# Patient Record
Sex: FEMALE | Race: WHITE | NOT HISPANIC OR LATINO | Employment: FULL TIME | URBAN - METROPOLITAN AREA
[De-identification: names, ages, dates, MRNs, and addresses within clinical notes are randomized per-mention and may not be internally consistent; named-entity substitution may affect disease eponyms.]

---

## 2017-11-21 ENCOUNTER — NURSE TRIAGE (OUTPATIENT)
Dept: NURSING | Facility: CLINIC | Age: 27
End: 2017-11-21

## 2017-11-22 NOTE — TELEPHONE ENCOUNTER
"  Reason for Disposition    [1] Eye has been washed out > 30 minutes ago AND [2] pain persists AND [3] more than mild     \"My eye started hurting today at work. I took my contact out and it was ripped. I have tried washing it out, used eye drops. My eye is still hurting and red. I can't even open it up.\" Denies other sx. Advised ER.    Additional Information    Negative: Doesn't sound like foreign body (FB) in the eye    Negative: Foreign body is a piece of chemical    Negative: Foreign body (FB) stuck on eyeball  (Exception: contact lens)    Negative: [1] Sharp FB (even if FB was removed) AND [2] any pain present now    Negative: [1] Eye has been washed out > 30 minutes ago AND [2] feels like FB is still present    Protocols used: EYE - FOREIGN BODY-ADULT-    "

## 2018-02-12 ENCOUNTER — OFFICE VISIT (OUTPATIENT)
Dept: PEDIATRICS | Facility: CLINIC | Age: 28
End: 2018-02-12
Payer: COMMERCIAL

## 2018-02-12 VITALS
SYSTOLIC BLOOD PRESSURE: 120 MMHG | OXYGEN SATURATION: 98 % | DIASTOLIC BLOOD PRESSURE: 82 MMHG | HEIGHT: 66 IN | BODY MASS INDEX: 47.09 KG/M2 | TEMPERATURE: 98.3 F | WEIGHT: 293 LBS | HEART RATE: 82 BPM

## 2018-02-12 DIAGNOSIS — G43.829 MENSTRUAL MIGRAINE WITHOUT STATUS MIGRAINOSUS, NOT INTRACTABLE: Primary | ICD-10-CM

## 2018-02-12 DIAGNOSIS — S16.1XXA STRAIN OF NECK MUSCLE, INITIAL ENCOUNTER: ICD-10-CM

## 2018-02-12 PROCEDURE — 99213 OFFICE O/P EST LOW 20 MIN: CPT | Performed by: INTERNAL MEDICINE

## 2018-02-12 RX ORDER — LEVONORGESTREL AND ETHINYL ESTRADIOL 0.15-0.03
KIT ORAL
Refills: 0 | COMMUNITY
Start: 2018-01-16 | End: 2018-08-02

## 2018-02-12 RX ORDER — SUMATRIPTAN 25 MG/1
25-50 TABLET, FILM COATED ORAL
Qty: 18 TABLET | Refills: 1 | Status: SHIPPED | OUTPATIENT
Start: 2018-02-12 | End: 2022-02-28

## 2018-02-12 RX ORDER — METHYLPREDNISOLONE 4 MG
TABLET, DOSE PACK ORAL
Refills: 0 | COMMUNITY
Start: 2018-02-09 | End: 2018-02-26

## 2018-02-12 RX ORDER — ALBUTEROL SULFATE 90 UG/1
2 AEROSOL, METERED RESPIRATORY (INHALATION)
COMMUNITY
Start: 2017-04-04 | End: 2018-12-28

## 2018-02-12 RX ORDER — AMOXICILLIN AND CLAVULANATE POTASSIUM 562.5; 437.5; 62.5 MG/1; MG/1; MG/1
TABLET, MULTILAYER, EXTENDED RELEASE ORAL
Refills: 0 | COMMUNITY
Start: 2018-02-09 | End: 2018-02-26

## 2018-02-12 RX ORDER — METHOCARBAMOL 500 MG/1
1000 TABLET, FILM COATED ORAL 3 TIMES DAILY PRN
Qty: 30 TABLET | Refills: 1 | Status: SHIPPED | OUTPATIENT
Start: 2018-02-12 | End: 2019-11-29

## 2018-02-12 NOTE — NURSING NOTE
"Chief Complaint   Patient presents with     URI       Initial /82 (BP Location: Right arm, Patient Position: Chair, Cuff Size: Adult Large)  Pulse 82  Temp 98.3  F (36.8  C) (Oral)  Ht 5' 6\" (1.676 m)  Wt (!) 326 lb 9.6 oz (148.1 kg)  SpO2 98%  BMI 52.71 kg/m2 Estimated body mass index is 52.71 kg/(m^2) as calculated from the following:    Height as of this encounter: 5' 6\" (1.676 m).    Weight as of this encounter: 326 lb 9.6 oz (148.1 kg).  Medication Reconciliation: complete   Jolene Sullivan MA 12:36 PM 2/12/2018     "

## 2018-02-12 NOTE — MR AVS SNAPSHOT
After Visit Summary   2/12/2018    Marlee Appiah    MRN: [id number]           Patient Information     Date Of Birth          1990        Visit Information        Provider Department      2/12/2018 12:30 PM [facility]ardo Mai, MD [facility]        Today's Diagnoses     Menstrual migraine without status migrainosus, not intractable    -  1    Strain of neck muscle, initial encounter        BMI 50.0-59.9, adult (H)           Follow-ups after your visit        Additional Services     ENDOCRINOLOGY ADULT REFERRAL       Your provider has referred you to: [facility]: [facility] [phone]   [email or web address]      Please be aware that coverage of these services is subject to the terms and limitations of your health insurance plan.  Call member services at your health plan with any benefit or coverage questions.      Please bring the following to your appointment:    >>   Any x-rays, CTs or MRIs which have been performed.  Contact the facility where they were done to arrange for  prior to your scheduled appointment.    >>   List of current medications   >>   This referral request   >>   Any documents/labs given to you for this referral                  Who to contact     If you have questions or need follow up information about today's clinic visit or your schedule please contact [facility] directly at [phone].  Normal or non-critical lab and imaging results will be communicated to you by MyChart, letter or phone within 4 business days after the clinic has received the results. If you do not hear from us within 7 days, please contact the clinic through MyChart or phone. If you have a critical or abnormal lab result, we will notify you by phone as soon as possible.  Submit refill requests through [employer] or call your pharmacy and they will forward the refill request to us. Please allow 3 business days for your refill to be completed.  "         Additional Information About Your Visit        Blink (air taxi)hart Information     [employer] gives you secure access to your electronic health record. If you see a primary care provider, you can also send messages to your care team and make appointments. If you have questions, please call your primary care clinic.  If you do not have a primary care provider, please call [phone] and they will assist you.        Care EveryWhere ID     This is your Care EveryWhere ID. This could be used by other organizations to access your [facility] medical records  [id number]        Your Vitals Were     Pulse Temperature Height Pulse Oximetry BMI (Body Mass Index)       82 98.3  F (36.8  C) (Oral) 5' 6\" (1.676 m) 98% 52.71 kg/m2        Blood Pressure from Last 3 Encounters:   02/12/18 120/82   12/21/11 128/80    Weight from Last 3 Encounters:   02/12/18 (!) 326 lb 9.6 oz (148.1 kg)              We Performed the Following     ENDOCRINOLOGY ADULT REFERRAL          Today's Medication Changes          These changes are accurate as of 2/12/18  1:23 PM.  If you have any questions, ask your nurse or doctor.               Start taking these medicines.        Dose/Directions    methocarbamol 500 MG tablet   Commonly known as:  ROBAXIN   Used for:  Strain of neck muscle, initial encounter   Started by:  Mike Leonardo Mai, MD        Dose:  1000 mg   Take 2 tablets (1,000 mg) by mouth 3 times daily as needed for muscle spasms   Quantity:  30 tablet   Refills:  1       SUMAtriptan 25 MG tablet   Commonly known as:  IMITREX   Used for:  Menstrual migraine without status migrainosus, not intractable   Started by:  Mike Leonardo Mai, MD        Dose:  25-50 mg   Take 1-2 tablets (25-50 mg) by mouth at onset of headache for migraine May repeat in 2 hours. Max 8 tablets/24 hours.   Quantity:  18 tablet   Refills:  1            Where to get your medicines      These medications were sent to [facility] [address], [place] - [address]  [address] [place] [address]     Phone:  [phone]     methocarbamol 500 MG tablet    SUMAtriptan 25 MG tablet                Primary Care Provider Office Phone # Fax #    Mike Leonardo [phone] [address] [place] [address]        Equal Access to Services     [facility]: Hadii aad ku hadasho Soomaali, waaxda luqadaha, qaybta kaalmada adeegyada, waxay idiin hayaan adeeg kharash la'aan ah. So [facility] [phone].    ATENCIÓN: Si habla español, tiene a diego disposición servicios gratuitos de asistencia lingüística. [facility] [phone].    We comply with applicable federal civil rights laws and [place] laws. We do not discriminate on the basis of race, color, national origin, age, disability, sex, sexual orientation, or gender identity.            Thank you!     Thank you for choosing [facility]  for your care. Our goal is always to provide you with excellent care. Hearing back from our patients is one way we can continue to improve our services. Please take a few minutes to complete the written survey that you may receive in the mail after your visit with us. Thank you!             Your Updated Medication List - Protect others around you: Learn how to safely use, store and throw away your medicines at www.disposemymeds.org.          This list is accurate as of 2/12/18  1:23 PM.  Always use your most recent med list.                   Brand Name Dispense Instructions for use Diagnosis    albuterol 108 (90 BASE) MCG/ACT Inhaler    PROAIR HFA/PROVENTIL HFA/VENTOLIN HFA     Inhale 2 puffs into the lungs        amoxicillin-clavulanate 1000-62.5 MG per 12 hr tablet    AUGMENTIN XR     TK 1 T PO BID AS DIRECTED        methocarbamol 500 MG tablet    ROBAXIN    30 tablet    Take 2 tablets (1,000 mg) by mouth 3 times daily as needed for muscle spasms    Strain of neck muscle, initial encounter       methylPREDNISolone 4  MG tablet    MEDROL DOSEPAK     TK UTD        PORTIA-28 0.15-30 MG-MCG per tablet   Generic drug:  levonorgestrel-ethinyl estradiol      TK 1 T PO QD        SUMAtriptan 25 MG tablet    IMITREX    18 tablet    Take 1-2 tablets (25-50 mg) by mouth at onset of headache for migraine May repeat in 2 hours. Max 8 tablets/24 hours.    Menstrual migraine without status migrainosus, not intractable

## 2018-02-12 NOTE — PROGRESS NOTES
"  SUBJECTIVE:   Marlee Appiah is a 27 year old female who presents to clinic today for the following health issues:    RESPIRATORY SYMPTOMS      Duration: ***    Description  { :022247}    Severity: {MILD, MODERATE, SEVERE LOW:900651}    Accompanying signs and symptoms: {NONE DEFAULTED:049204::\"None\"}    History (predisposing factors):  { :301221::\"none\"}    Precipitating or alleviating factors: {NONE DEFAULTED:067328::\"None\"}    Therapies tried and outcome:  { :287205::\"none\"}      {additional problems for provider to add:654161}    Problem list and histories reviewed & adjusted, as indicated.  Additional history: {NONE - AS DOCUMENTED:499877::\"as documented\"}    {HIST REVIEW/ LINKS 2:175391}    Reviewed and updated as needed this visit by clinical staff       Reviewed and updated as needed this visit by Provider         {PROVIDER CHARTING PREFERENCE:566866}  "

## 2018-02-12 NOTE — PATIENT INSTRUCTIONS
About Migraine Headaches  What is a migraine headache?  A migraine is a very painful type of headache. It may last a few hours or days. During a migraine, you may have vision problems and feel sick to your stomach.  Migraines are three times more common in women than in men. Once they start, you may get them for the rest of your life. They may occur less often as you age.  What causes it?  We don't know the exact cause, but many things can trigger a migraine. These include:    Stress and anxiety    Lack of food or sleep    Foods and drinks that contain tyramine, such as:    Red yoav and some beers    Aged cheeses (like cheddar or blue cheese)    Yeast    Aged, dried or cured meats    Fermented foods like sauerkraut, soy sauce, miso and dominic chi    Too much light    Chemicals (gasoline, cleaning products, perfume, glue, etc.)    Weather changes    Certain medicines    Hormone changes (in women).  What are symptoms?  Some people can tell when they're about to have a migraine. They may see flashing lights or zigzag lines in front of their eyes. Or they may lose their vision for a short time.  With a migraine, you may:    Feel pain or pulsing on one side of the head. For some people, the entire head hurts.    Be very sensitive to light and sound.    Feel nauseated (sick to your stomach) and vomit (throw up).  How is it treated?  Your care team may suggest medicine to prevent or relieve your symptoms. Once you start having migraines, you may also need medicine to keep them from getting worse.   Take your medicine at the first sign of a migraine. It may take several tries to find a medicine that works for you.   When a migraine comes:    Lie down in a quiet, dark room. Try not to bend over, as this may cause more pain.    Put a cold pack on your head. Try a bag of frozen vegetables, wrapped with a thin cloth.    Drink extra fluids. If you can't drink, suck on ice chips.  How can I prevent migraines?  It will help to keep  a migraine diary. By keeping a diary, you may find the cause of your headaches. Once you know the cause, you can take steps to prevent migraines in the future.  It also helps to live a healthy lifestyle. This means:    Get regular exercise. (If exercise triggers your headaches, tell your doctor.)    Drink plenty of water.    Eat healthy meals at regular times.    Try to go to bed and get up and regular times.    Don't smoke. Avoid second-hand smoke.    Avoid caffeine. Coffee, tea and soda may help a migraine. But if you drink them too often, they can cause migraines.    Find ways to relax, have fun and reduce stress in your life.    Try complementary therapies (yoga, acupuncture, massage, biofeedback, etc.).  When should I call my clinic?  Call your clinic at once if you have new or unusual symptoms, such as:     Pain that gets worse or lasts more than 24 hours.    Slurred speech or problems talking.    A weak arm or leg that you can't move normally.    A fever over 100 F (37.8 C), taken under the tongue.    Stiff neck.    Vomiting (throwing up) for several hours.  For more information about migraines  Contact the [employer] [place] for Headache Education (ACHE) at 1-667.943.3352 or www.headaches.org.  Local providers of complementary therapies  These services may not be covered by insurance. Check your insurance plan.  [facility]  [phone]   Includes pain education, behavioral therapy,   trigger point injections and more.  [facility]   [phone]   Includes acupuncture, massage, myofascial release.  [facility] at the [facility]  [phone]  [email or web address]  Includes mindfulness, meditation, yoga.  Community Education     [place]: commed.mpls.k12.[place].[facility]. [place]: commedprograms.spps.org  Look for programs on yoga, mindfulness, etc.  [facility]    [phone]  www.[place]ways[place].org  Includes mindfulness, yoga, body-mind skills.  For informational purposes only. Not to replace the advice of your health care provider.   Copyright   2011 [employer] [facility]. All rights reserved. [employer] 311023 - 11/15.    Preventing Migraine Headaches: Medicines and Lifestyle Changes     Going to bed and getting up at the same time each day, including weekends, may help prevent migraines.     A migraine is a type of severe headache. Having a migraine can be very painful. But there are steps you can take to help prevent migraines.  Medicines to help prevent migraines    Your healthcare provider may prescribe certain medicines to help prevent migraines. These medicines may need to be taken daily. Or they may only need to be taken at times when you re likely to have a migraine.    Common medicines used to help prevent migraines include:    Triptans (serotonin receptor agonists)    Nonsteroidal anti-inflammatory drugs (available over-the-counter)    Beta-blockers    Anticonvulsants    Tricyclic antidepressants    Calcium channel blockers    Certain vitamins, minerals, and plant extracts    Botulinum toxin injection (Botox) for certain chronic migraines     CGRP (calcitonin gene-related peptide) agnonists are being reviewed by the Food and Drug Administration (FDA)  Lifestyle changes for long-term prevention  Here are some suggestions:    Exercise. Regular exercise can help prevent migraines and improve your health. (If exercise triggers your migraines, talk to your healthcare provider.)    Keep regular habits. Don t skip or delay meals. Drink plenty of water. And go to bed and get up at about the same time each day. This includes weekends.    Try alternative treatments. These are treatments that do not involve the use of medicines or surgery. They may help relieve symptoms and prevent migraines. Some treatment options include biofeedback and acupuncture. Ask  your healthcare provider to tell you more about these treatments if you have questions.    Limit caffeine. You may find that caffeine helps relieve pain during an attack. But too much caffeine can also trigger migraines. So, limit the amount of caffeine you consume.  Date Last Reviewed: 10/11/2015    0312-6397 [employer]. [address] [place] [address], [place] [address]. All rights reserved. This information is not intended as a substitute for professional medical care. Always follow your healthcare professional's instructions.

## 2018-02-12 NOTE — PROGRESS NOTES
"  SUBJECTIVE:   Marlee Appiah is a 27 year old female who presents to clinic today for the following health issues:    Headaches      Duration: 1 year, happens every month    Description  Location: unilateral in the right occipital area   Character: throbbing pain, sharp pain  Frequency:  All day for 1 week before period  Duration:  1 week    Intensity:  severe    Accompanying signs and symptoms:    Precipitating or Alleviating factors:  Nausea/vomiting: no  Dizziness: usually  Weakness or numbness: no  Visual changes: sensitivity to lights  Fever: no   Sinus or URI symptoms YES- on antibiotics for sinusitis    History  Head trauma: YES- a few years ago patient fell on the ice and hit the back of her head and has gotten cluster headaches  Family history of migraines: no   Previous tests for headaches: YES- MRI with neuro with normal results  Neurologist evaluations: YES- cluster headaches   Able to do daily activities when headache present: YES- but makes it difficult with reading as patient is currently in school.  Wake with headaches: YES  Daily pain medication use: no, only when headaches happen 1 week a month  Any changes in: none    Precipitating or Alleviating factors (light/sound/sleep/caffeine): Always happens 1 week prior to getting period    Therapies tried and outcome: heat, ice and Ibuprofen (Advil, Motrin)    Outcome - effective except for heat  Frequent/daily pain medication use: no     A week before period patient states that she gets \"insane headaches\". Patient searches online and said her symptoms sounded like occipital neurologia. Wants to discuss muscle relaxers.  Used to get tension headaches (frontal and behind the eyes) and was diagnosed by neurologist.  These headaches are different and patient started noticing the pattern for at least 6 months.    Problem list and histories reviewed & adjusted, as indicated.  Additional history: as documented    There is no problem list on file for this " "patient.    History reviewed. No pertinent surgical history.    Social History   Substance Use Topics     Smoking status: Never Smoker     Smokeless tobacco: Never Used     Alcohol use No     Family History   Problem Relation Age of Onset     DIABETES Father          Current Outpatient Prescriptions   Medication Sig Dispense Refill     albuterol (PROAIR HFA/PROVENTIL HFA/VENTOLIN HFA) 108 (90 BASE) MCG/ACT Inhaler Inhale 2 puffs into the lungs       amoxicillin-clavulanate (AUGMENTIN XR) 1000-62.5 MG per 12 hr tablet TK 1 T PO BID AS DIRECTED  0     NICKOLAS-28 0.15-30 MG-MCG per tablet TK 1 T PO QD  0     methylPREDNISolone (MEDROL DOSEPAK) 4 MG tablet TK UTD  0     No Known Allergies    Reviewed and updated as needed this visit by clinical staff       Reviewed and updated as needed this visit by Provider         ROS:  All other systems on a 10-point review are negative, unless otherwise noted in HPI      OBJECTIVE:     /82 (BP Location: Right arm, Patient Position: Chair, Cuff Size: Adult Large)  Pulse 82  Temp 98.3  F (36.8  C) (Oral)  Ht 5' 6\" (1.676 m)  Wt (!) 326 lb 9.6 oz (148.1 kg)  SpO2 98%  BMI 52.71 kg/m2  Body mass index is 52.71 kg/(m^2).  GENERAL: healthy, alert and no distress  EYES: Eyes grossly normal to inspection, PERRL and conjunctivae and sclerae normal  HENT: ear canals and TM's normal, nose and mouth without ulcers or lesions  NECK: no adenopathy, no asymmetry, masses, or scars and thyroid normal to palpation  RESP: lungs clear to auscultation - no rales, rhonchi or wheezes  CV: regular rate and rhythm, normal S1 S2, no S3 or S4, no murmur, click or rub, no peripheral edema and peripheral pulses strong  ABDOMEN: soft, nontender, no hepatosplenomegaly, no masses and bowel sounds normal  MS: no gross musculoskeletal defects noted, no edema  NEURO: Normal strength and tone, sensory exam grossly normal, mentation intact, cranial nerves 2-12 intact and DTR's normal and symmetric "     Diagnostic Test Results:  none     ASSESSMENT/PLAN:       1. Menstrual migraine without status migrainosus, not intractable  Will start with triptan therapy.  If no improvement, will consider continuous birth control.  - SUMAtriptan (IMITREX) 25 MG tablet; Take 1-2 tablets (25-50 mg) by mouth at onset of headache for migraine May repeat in 2 hours. Max 8 tablets/24 hours.  Dispense: 18 tablet; Refill: 1    2. Strain of neck muscle, initial encounter  Headaches often start with strain in neck.  Will do a trial of muscle relaxer.  - methocarbamol (ROBAXIN) 500 MG tablet; Take 2 tablets (1,000 mg) by mouth 3 times daily as needed for muscle spasms  Dispense: 30 tablet; Refill: 1    3. BMI 50.0-59.9, adult (H)  Has already tried several options including , nutrition counseling, and calorie counting.  - ENDOCRINOLOGY ADULT REFERRAL    See Patient Instructions    Marlena Leonardo MD  [facility]

## 2018-02-18 ENCOUNTER — HEALTH MAINTENANCE LETTER (OUTPATIENT)
Age: 28
End: 2018-02-18

## 2018-02-26 ENCOUNTER — OFFICE VISIT (OUTPATIENT)
Dept: ENDOCRINOLOGY | Facility: CLINIC | Age: 28
End: 2018-02-26
Payer: COMMERCIAL

## 2018-02-26 VITALS
TEMPERATURE: 98.1 F | SYSTOLIC BLOOD PRESSURE: 118 MMHG | BODY MASS INDEX: 47.09 KG/M2 | HEART RATE: 100 BPM | OXYGEN SATURATION: 99 % | HEIGHT: 66 IN | WEIGHT: 293 LBS | DIASTOLIC BLOOD PRESSURE: 80 MMHG

## 2018-02-26 DIAGNOSIS — E66.01 MORBID OBESITY (H): ICD-10-CM

## 2018-02-26 LAB — HBA1C MFR BLD: 5.6 % (ref 4.3–6)

## 2018-02-26 PROCEDURE — 99203 OFFICE O/P NEW LOW 30 MIN: CPT | Performed by: INTERNAL MEDICINE

## 2018-02-26 PROCEDURE — 84443 ASSAY THYROID STIM HORMONE: CPT | Performed by: INTERNAL MEDICINE

## 2018-02-26 PROCEDURE — 36415 COLL VENOUS BLD VENIPUNCTURE: CPT | Performed by: INTERNAL MEDICINE

## 2018-02-26 PROCEDURE — 83036 HEMOGLOBIN GLYCOSYLATED A1C: CPT | Performed by: INTERNAL MEDICINE

## 2018-02-26 NOTE — LETTER
2/26/2018         RE: Marlee Appiah  [address]  [place] [address]        Dear Colleague,    Thank you for referring your patient, Marlee Appiah, to the [facility]. Please see a copy of my visit note below.    Name: Marlee Appiah  Seen at the request of No ref. provider found for obesity.  HPI:  Marlee Appiah is a 27 year old female who presents for the evaluation of obestiy.  Body mass index is 54.44 kg/(m^2).  Growing up was heavy.  Always was a heavy person.  Weight gain started: 5 years back-- depression at that time.  Highest weight as an adult: 337  lbs  Lowest weight as an adult: in high school-- 220 lbs    Diets tried: tried ww, santo man diet and portion controlled     Gastric bypass history: no. Does not want it 2/2 to possible s/e. Dad had it and had s/e.    Hypothyroidism: no    Use of Steroids:No  Family history of Obesity:Yes: many family members  Diet: currently pt is working on-- portion control.  Exercise:Yes: during summer more exercise. 15-30 min on elliptical 3-4 times/day. Was seen by .    Menses: regular. Not planning pregnancy.  Diarrhea/Constipation:No  Changes in Hair or Skin:No  Diabetes:No   Snores :thinks that she snores at night   HTN: no  Hyperlipidemia:No  Hirsutism:No  Easy Brusing:No    PMH/PSH:  History reviewed. No pertinent past medical history.  History reviewed. No pertinent surgical history.  Family Hx:  Family History   Problem Relation Age of Onset     DIABETES Father             Social Hx:  Social History     Social History     Marital status: Single     Spouse name: N/A     Number of children: N/A     Years of education: N/A     Occupational History     Not on file.     Social History Main Topics     Smoking status: Never Smoker     Smokeless tobacco: Never Used     Alcohol use No     Drug use: No     Sexual activity: Not Currently     Other Topics Concern     Not on file     Social History Narrative          MEDICATIONS:  has a  "current medication list which includes the following prescription(s): albuterol, sushant-28, sumatriptan, and methocarbamol.    ROS     ROS: 10 point ROS neg other than the symptoms noted above in the HPI.    Physical Exam   VS: /80 (BP Location: Right arm, Patient Position: Chair, Cuff Size: Adult Large)  Pulse 100  Temp 98.1  F (36.7  C) (Oral)  Ht 1.676 m (5' 6\")  Wt (!) 153 kg (337 lb 4.8 oz)  SpO2 99%  BMI 54.44 kg/m2  GENERAL: AXOX3, NAD, well dressed, answering questions appropriately, appears stated age.  HEENT: No exopthalmous, no proptosis, EOMI, no lig lag, no retraction  NECK: Thyroid normal in size, non tender, no nodules were palpated.  CV: RRR, no rubs, gallops, no murmurs  LUNGS: CTAB, no wheezes, rales, or ronchi  ABDOMEN: +BS. + stretch marks, not hyperpigmented.  EXTREMITIES: no edema, +pulses, no rashes, no lesions  NEUROLOGY: CN grossly intact, + DTR upper and lower extremity, no tremors  MSK: grossly intact  SKIN: no rashes, no lesions    LABS:  No recent labs to review    All pertinent notes, labs, and images personally reviewed by me.     A/P  Ms.Angela Appiah is a 27 year old here for the evaluation of obestiy:    1. Obesity-    Body mass index is 54.44 kg/(m^2).  Obesity is associated with a significant increase in mortality and risk of many disorders, including diabetes mellitus, hypertension, dyslipidemia, heart disease, stroke, sleep apnea, cancer, and many others. Conversely, weight loss is associated with a reduction in obesity-associated morbidity.  Endocrine evaluation of obesity includes Cushing's and thyroid dysfunction.  Will obtain TSH and freeT4 along with 24 hour urine collection.   She is not interested in bariatric surgery.    Plan: WEIGHT MANAGEMENT INFO, TSH with free T4         reflex, Cortisol free urine, HEALTH          REFERRAL, Hemoglobin A1c, NUTRITION REFERRAL,         HEALTH  REFERRAL, SLEEP EVALUATION &         MANAGEMENT REFERRAL - ADULT " -[facility]  [phone] (Age 18 and         up)               Discussed:  I informed the pt that:  1.Weight loss medications can be taken to assist with weight reduction when combined with appropriate healthy lifestyle changes.  2.I discussed possible s/e, risks and benefits of weight loss medications.  3.All medications are FDA approved, however, some may be used ''off label'' for their weight loss benefitIs and some ''short term'' medications can be used for longer periods to achieve desired clinical outcomes.  4.All patients taking weight loss medications must be seen in clinic for refill authorization.  Risks of obestiy discussed. Encourage healthy diet. Limit snacks, fluid calories, portions. Limit TV/computer time to one hour a day. Encourage physical activity. Recheck in six months or sooner with concerns.    Obesity is a biological preventable and treatable disease, which is associated with significantly increased risk of many acute and chronic health conditions. Obesity has now been recognized as a chronic disease by the American Medical Association.    A range of serious co-morbidities are associated with obesity including increased risk for hypertension, stroke, coronary artery disease, dyslipidemia, Type II diabetes, depression, sleep apnea, cancers of the colon, breast and endometrium, osteoarthritis and female infertility. Therefore, obesity is not just a problem; it s a disease that warrants serious evidence based treatements.    I explained to the patient relevant work up for the diagnosis and management of obesity and discussed treatment options. Body Mass Index (BMI) has been a standard means for calculating risk for overweight and obesity. The new American Association of Clinical Endocrinology (AACE) algorithm recommends lifestyle modifications as the initial phase of treatment for all patients with the BMI equal or greater than 25 kg/m2. Lifestyle modifications  includes use of medical nutrition therapy, exercise, tobacco cessation, adequate quality and quantity of sleep, limited consumption of alcohol and reduced stress through implementation of a structured, multidisciplinary program.    In patients with complications associated with obesity, graded interventions are recommended including pharmacological therapy such as phentermine, orlistat, lorcaserin and phentermine/topiramate ER, contrave ( buproprion/naltreone) and the use of very low calorie meal replacement programs.    If medical intervention is insufficient, surgical therapy may be considered, especially in patients with BMI greater than or equal to 35 kg/m2 with multiple complications. Surgical treatments include lap-band, gastric sleeve or gastric bypass surgery.      More than 50% of the time spent with Ms. Appiah on counseling / coordinating her care.  Total appointment time was 30 minutes.      Follow-up:  After above    Andree Cunningham MD  Endocrinology   [facility]    CC: Mike Leonardo Mai    Addendum to above note and clinic visit:    Labs reviewed.    See result note/telephone encounter.            Again, thank you for allowing me to participate in the care of your patient.        Sincerely,        Andree Cunningham MD

## 2018-02-26 NOTE — MR AVS SNAPSHOT
After Visit Summary   2018    Marlee Appiah    MRN: [id number]           Patient Information     Date Of Birth          1990        Visit Information        Provider Department      2018 4:00 PM Andree Cunningham MD [facility]        Today's Diagnoses     Morbid obesity (H)          Care Instructions    [facility] & [place] locations   Dr Cunningham, Endocrinology Department      [facility]   [address]  [place] [address]  Appointment [phone]  Fax: [phone]  [place]: Monday and Tuesday         [place] [address]  [place] [address]  Appointment [phone]  Fax: [phone]  [place]: Wednesday and Thursday            Labs today  Follow up with dietician ( if covered by insurance) and sleep study  Follow up after above  The patient is advised to Make better food choices: reduce carbs, Reduce portion size, weight loss and exercise 3-4 times a week.      24 Hour Urine Collection    - During a 24-hour urine collection, follow your usual diet and drink fluids as you ordinarily would.  - Avoid drinking alcohol before and during the urine collection.    - For a 24-hour urine collection, all of the urine that you pass over a 24-hour time period must be collected. You will receive a special container to collect the sample.    The following are directions for collecting a 24-hour urine sample while at home:    In the morning scheduled to begin the urine collection, urinate in the toilet and flush away the first urine you pass. Write down the date and time. That is the start date and time for the collection.    Collect all urine you pass, day and night, for 24 hours. Use the container given to you to collect the urine. Avoid using other containers. The urine sample must include the last urine that you pass 24 hours after starting the  collection. Do not allow toilet paper, stool, or anything else to be added to the urine sample.    Write down the date and time that the last sample is collected.    Health :  A health  will reach out to you over the telephone within the next 3 business days to answer any questions you may have and set up your first coaching session. If they are not able to reach you please feel free to call back at the number left on your voicemail. Thank you and we are excited you are taking advantage of this wonderful opportunity to improve your health!   With this program, patients have an opportunity to work with a Health , over the phone at no cost for 6 coaching total sessions over a period of seven months.   [facility] use a non-judgmental collaborative approach when working with patients. Coaches support patients in gaining knowledge, skills, tools and confidence to become active participants in their health. Your  will work with you one-on-one to set small, achievable goals and provide accountability to help you meet your vision.   [facility] have had success supporting patients in the following areas:   * Chronic Disease Management   * Weight Management   * Nutrition   * Exercise   * Stress Management   * Other Health Related Goals                Follow-ups after your visit        Additional Services     HEALTH  REFERRAL       Your provider has referred you to a Health .    A Health  will reach out to the patient within three days, if the following criteria has been met.     Clinic: [facility]    Reason for Referral: Obesity Body mass index is 54.44 kg/(m^2).    Patient does have knowledge of this service.    Patient Criteria: obesity Body mass index is 54.44 kg/(m^2).      Provider's Main Focus: Diet/Weight Loss            NUTRITION REFERRAL       Your provider has referred you to: [facility]: [facility] [place]  [place] [phone]   [email or web address]    Please be aware that coverage of these services is subject to the terms and limitations of your health insurance plan.  Call member services at your health plan with any benefit or coverage questions.      Please bring the following to your appointment:      >>   This referral request   >>   Any documents given to you for this referral  >>   Any specific questions you have about diet or food choices                  Future tests that were ordered for you today     Open Future Orders        Priority Expected Expires Ordered    Cortisol free urine Routine  2/27/2019 2/26/2018            Who to contact     If you have questions or need follow up information about today's clinic visit or your schedule please contact [facility] [place] directly at [phone].  Normal or non-critical lab and imaging results will be communicated to you by MyChart, letter or phone within 4 business days after the clinic has received the results. If you do not hear from us within 7 days, please contact the clinic through MyChart or phone. If you have a critical or abnormal lab result, we will notify you by phone as soon as possible.  Submit refill requests through [facility] or call your pharmacy and they will forward the refill request to us. Please allow 3 business days for your refill to be completed.          Additional Information About Your Visit        [employer]ks Information     [employer] gives you secure access to your electronic health record. If you see a primary care provider, you can also send messages to your care team and make appointments. If you have questions, please call your primary care clinic.  If you do not have a primary care provider, please call [phone] and they will assist you.        Care EveryWhere ID     This is your Care EveryWhere ID. This could be used by other organizations to access your [facility]  "records  [id number]        Your Vitals Were     Pulse Temperature Height Pulse Oximetry BMI (Body Mass Index)       100 98.1  F (36.7  C) (Oral) 1.676 m (5' 6\") 99% 54.44 kg/m2        Blood Pressure from Last 3 Encounters:   02/26/18 118/80   02/12/18 120/82   12/21/11 128/80    Weight from Last 3 Encounters:   02/26/18 (!) 153 kg (337 lb 4.8 oz)   02/12/18 (!) 148.1 kg (326 lb 9.6 oz)              We Performed the Following     HEALTH  REFERRAL     Hemoglobin A1c     NUTRITION REFERRAL     TSH with free T4 reflex     WEIGHT MANAGEMENT INFO        Primary Care Provider Office Phone # Fax #    Sadialayne Hortensia Leonardo [phone] [address] [place] [address]        Equal Access to Services     [facility]: Hadii aad ku hadasho Soember, waaxda luqadaha, qaybta kaalmada netteyamini, jr rivera . So [facility] [phone].    ATENCIÓN: Si habla español, tiene a diego disposición servicios gratuitos de asistencia lingüística. LlThe [facility] [phone].    We comply with applicable federal civil rights laws and [place] laws. We do not discriminate on the basis of race, color, national origin, age, disability, sex, sexual orientation, or gender identity.            Thank you!     [facility]. Our goal is always to provide you with excellent care. Hearing back from our patients is one way we can continue to improve our services. Please take a few minutes to complete the written survey that you may receive in the mail after your visit with us. Thank you!             Your Updated Medication List - Protect others around you: Learn how to safely use, store and throw away your medicines at www.disposemymeds.org.          This list is accurate as of 2/26/18  4:26 PM.  Always use your most recent med list.                   Brand Name Dispense Instructions for use Diagnosis    albuterol 108 (90 BASE) MCG/ACT Inhaler    " PROAIR HFA/PROVENTIL HFA/VENTOLIN HFA     Inhale 2 puffs into the lungs        methocarbamol 500 MG tablet    ROBAXIN    30 tablet    Take 2 tablets (1,000 mg) by mouth 3 times daily as needed for muscle spasms    Strain of neck muscle, initial encounter       NICKOLAS-28 0.15-30 MG-MCG per tablet   Generic drug:  levonorgestrel-ethinyl estradiol      TK 1 T PO QD        SUMAtriptan 25 MG tablet    IMITREX    18 tablet    Take 1-2 tablets (25-50 mg) by mouth at onset of headache for migraine May repeat in 2 hours. Max 8 tablets/24 hours.    Menstrual migraine without status migrainosus, not intractable

## 2018-02-26 NOTE — NURSING NOTE
"Chief Complaint   Patient presents with     Referral     Dr Leonardo Weight        Initial /80 (BP Location: Right arm, Patient Position: Chair, Cuff Size: Adult Large)  Pulse 100  Temp 98.1  F (36.7  C) (Oral)  Ht 1.676 m (5' 6\")  Wt (!) 153 kg (337 lb 4.8 oz)  SpO2 99%  BMI 54.44 kg/m2 Estimated body mass index is 54.44 kg/(m^2) as calculated from the following:    Height as of this encounter: 1.676 m (5' 6\").    Weight as of this encounter: 153 kg (337 lb 4.8 oz).  Medication Reconciliation: complete     ENDOCRINOLOGY INTAKE FORM    Patient Name:  Marlee Appiah  :  1990    Is patient Diabetic?   No  Does patient have non-diabetic or other endocrine issues?  Yes: weight     Vitals: /80 (BP Location: Right arm, Patient Position: Chair, Cuff Size: Adult Large)  Pulse 100  Temp 98.1  F (36.7  C) (Oral)  Ht 1.676 m (5' 6\")  Wt (!) 153 kg (337 lb 4.8 oz)  SpO2 99%  BMI 54.44 kg/m2  BMI= Body mass index is 54.44 kg/(m^2).    Flu vaccine:  No  Pneumonia vaccine:  No    Smoking and Alcohol use:  Social History   Substance Use Topics     Smoking status: Never Smoker     Smokeless tobacco: Never Used     Alcohol use No         OBESITY CONCERNS:  No ref. provider found       Initial Visit Previous Visit Current Change   Weight   153kg    Height   5 6     BMI   54.44      Weight at graduation of high school: 220lbs  Diabetes:  No  Sleep Apnea/Snores: Yes: snore  Hypertension:  No  Hyperlipidemia:  No  Use of steroids:  No  Family history of obesity:  Yes: both sides  Diet:  Good   Exercise: Yes: a little     Staff Signature:  Jillian Drake CMA ([facility])        "

## 2018-02-26 NOTE — PROGRESS NOTES
Name: Marlee Appiah  Seen at the request of No ref. provider found for obesity.  HPI:  Marlee Appiah is a 27 year old female who presents for the evaluation of obestiy.  Body mass index is 54.44 kg/(m^2).  Growing up was heavy.  Always was a heavy person.  Weight gain started: 5 years back-- depression at that time.  Highest weight as an adult: 337  lbs  Lowest weight as an adult: in high school-- 220 lbs    Diets tried: tried ww, cave man diet and portion controlled     Gastric bypass history: no. Does not want it 2/2 to possible s/e. Dad had it and had s/e.    Hypothyroidism: no    Use of Steroids:No  Family history of Obesity:Yes: many family members  Diet: currently pt is working on-- portion control.  Exercise:Yes: during summer more exercise. 15-30 min on elliptical 3-4 times/day. Was seen by .    Menses: regular. Not planning pregnancy.  Diarrhea/Constipation:No  Changes in Hair or Skin:No  Diabetes:No   Snores :thinks that she snores at night   HTN: no  Hyperlipidemia:No  Hirsutism:No  Easy Brusing:No    PMH/PSH:  History reviewed. No pertinent past medical history.  History reviewed. No pertinent surgical history.  Family Hx:  Family History   Problem Relation Age of Onset     DIABETES Father             Social Hx:  Social History     Social History     Marital status: Single     Spouse name: N/A     Number of children: N/A     Years of education: N/A     Occupational History     Not on file.     Social History Main Topics     Smoking status: Never Smoker     Smokeless tobacco: Never Used     Alcohol use No     Drug use: No     Sexual activity: Not Currently     Other Topics Concern     Not on file     Social History Narrative          MEDICATIONS:  has a current medication list which includes the following prescription(s): albuterol, sushant-28, sumatriptan, and methocarbamol.    ROS     ROS: 10 point ROS neg other than the symptoms noted above in the HPI.    Physical Exam   VS: /80  "(BP Location: Right arm, Patient Position: Chair, Cuff Size: Adult Large)  Pulse 100  Temp 98.1  F (36.7  C) (Oral)  Ht 1.676 m (5' 6\")  Wt (!) 153 kg (337 lb 4.8 oz)  SpO2 99%  BMI 54.44 kg/m2  GENERAL: AXOX3, NAD, well dressed, answering questions appropriately, appears stated age.  HEENT: No exopthalmous, no proptosis, EOMI, no lig lag, no retraction  NECK: Thyroid normal in size, non tender, no nodules were palpated.  CV: RRR, no rubs, gallops, no murmurs  LUNGS: CTAB, no wheezes, rales, or ronchi  ABDOMEN: +BS. + stretch marks, not hyperpigmented.  EXTREMITIES: no edema, +pulses, no rashes, no lesions  NEUROLOGY: CN grossly intact, + DTR upper and lower extremity, no tremors  MSK: grossly intact  SKIN: no rashes, no lesions    LABS:  No recent labs to review    All pertinent notes, labs, and images personally reviewed by me.     A/P  Ms.Angela Appiah is a 27 year old here for the evaluation of obestiy:    1. Obesity-    Body mass index is 54.44 kg/(m^2).  Obesity is associated with a significant increase in mortality and risk of many disorders, including diabetes mellitus, hypertension, dyslipidemia, heart disease, stroke, sleep apnea, cancer, and many others. Conversely, weight loss is associated with a reduction in obesity-associated morbidity.  Endocrine evaluation of obesity includes Cushing's and thyroid dysfunction.  Will obtain TSH and freeT4 along with 24 hour urine collection.   She is not interested in bariatric surgery.    Plan: WEIGHT MANAGEMENT INFO, TSH with free T4         reflex, Cortisol free urine, HEALTH          REFERRAL, Hemoglobin A1c, NUTRITION REFERRAL,         HEALTH  REFERRAL, SLEEP EVALUATION &         MANAGEMENT REFERRAL - [facility]         [facility]  [phone] (Age 18 and         up)               Discussed:  I informed the pt that:  1.Weight loss medications can be taken to assist with weight reduction when combined with appropriate " healthy lifestyle changes.  2.I discussed possible s/e, risks and benefits of weight loss medications.  3.All medications are FDA approved, however, some may be used ''off label'' for their weight loss benefitIs and some ''short term'' medications can be used for longer periods to achieve desired clinical outcomes.  4.All patients taking weight loss medications must be seen in clinic for refill authorization.  Risks of obestiy discussed. Encourage healthy diet. Limit snacks, fluid calories, portions. Limit TV/computer time to one hour a day. Encourage physical activity. Recheck in six months or sooner with concerns.    Obesity is a biological preventable and treatable disease, which is associated with significantly increased risk of many acute and chronic health conditions. Obesity has now been recognized as a chronic disease by the American Medical Association.    A range of serious co-morbidities are associated with obesity including increased risk for hypertension, stroke, coronary artery disease, dyslipidemia, Type II diabetes, depression, sleep apnea, cancers of the colon, breast and endometrium, osteoarthritis and female infertility. Therefore, obesity is not just a problem; it s a disease that warrants serious evidence based treatements.    I explained to the patient relevant work up for the diagnosis and management of obesity and discussed treatment options. Body Mass Index (BMI) has been a standard means for calculating risk for overweight and obesity. The new American Association of Clinical Endocrinology (AACE) algorithm recommends lifestyle modifications as the initial phase of treatment for all patients with the BMI equal or greater than 25 kg/m2. Lifestyle modifications includes use of medical nutrition therapy, exercise, tobacco cessation, adequate quality and quantity of sleep, limited consumption of alcohol and reduced stress through implementation of a structured, multidisciplinary program.    In  patients with complications associated with obesity, graded interventions are recommended including pharmacological therapy such as phentermine, orlistat, lorcaserin and phentermine/topiramate ER, contrave ( buproprion/naltreone) and the use of very low calorie meal replacement programs.    If medical intervention is insufficient, surgical therapy may be considered, especially in patients with BMI greater than or equal to 35 kg/m2 with multiple complications. Surgical treatments include lap-band, gastric sleeve or gastric bypass surgery.      More than 50% of the time spent with Ms. Appiah on counseling / coordinating her care.  Total appointment time was 30 minutes.      Follow-up:  After above    Andree Cunningham MD  Endocrinology   [facility]    CC: Mike Leonardo Mai    Addendum to above note and clinic visit:    Labs reviewed.    See result note/telephone encounter.

## 2018-02-26 NOTE — PATIENT INSTRUCTIONS
[facility]   Dr Cunningham, Endocrinology Department      [facility]   [address]  [place] [address]  Appointment [phone]  Fax: [phone]  [place]: Monday and Tuesday         [facility]   [address]  [place] [address]  Appointment [phone]  Fax: [phone]  [place]: Wednesday and Thursday            Labs today  Follow up with dietician ( if covered by insurance) and sleep study  Follow up after above  The patient is advised to Make better food choices: reduce carbs, Reduce portion size, weight loss and exercise 3-4 times a week.      24 Hour Urine Collection    - During a 24-hour urine collection, follow your usual diet and drink fluids as you ordinarily would.  - Avoid drinking alcohol before and during the urine collection.    - For a 24-hour urine collection, all of the urine that you pass over a 24-hour time period must be collected. You will receive a special container to collect the sample.    The following are directions for collecting a 24-hour urine sample while at home:    In the morning scheduled to begin the urine collection, urinate in the toilet and flush away the first urine you pass. Write down the date and time. That is the start date and time for the collection.    Collect all urine you pass, day and night, for 24 hours. Use the container given to you to collect the urine. Avoid using other containers. The urine sample must include the last urine that you pass 24 hours after starting the collection. Do not allow toilet paper, stool, or anything else to be added to the urine sample.    Write down the date and time that the last sample is collected.    Health :  A health  will reach out to you over the telephone within the next 3 business days to answer any questions you may have and set up your first coaching session. If they are not  able to reach you please feel free to call back at the number left on your voicemail. Thank you and we are excited you are taking advantage of this wonderful opportunity to improve your health!   With this program, patients have an opportunity to work with a Health , over the phone at no cost for 6 coaching total sessions over a period of seven months.   [facility] use a non-judgmental collaborative approach when working with patients. Coaches support patients in gaining knowledge, skills, tools and confidence to become active participants in their health. Your  will work with you one-on-one to set small, achievable goals and provide accountability to help you meet your vision.   [facility] have had success supporting patients in the following areas:   * Chronic Disease Management   * Weight Management   * Nutrition   * Exercise   * Stress Management   * Other Health Related Goals

## 2018-02-27 LAB — TSH SERPL DL<=0.005 MIU/L-ACNC: 3.95 MU/L (ref 0.4–4)

## 2018-02-28 ENCOUNTER — TELEPHONE (OUTPATIENT)
Dept: NURSING | Facility: CLINIC | Age: 28
End: 2018-02-28

## 2018-03-05 ENCOUNTER — TELEPHONE (OUTPATIENT)
Dept: NURSING | Facility: CLINIC | Age: 28
End: 2018-03-05

## 2018-03-05 DIAGNOSIS — E66.01 MORBID OBESITY (H): ICD-10-CM

## 2018-03-05 PROCEDURE — 99000 SPECIMEN HANDLING OFFICE-LAB: CPT | Performed by: INTERNAL MEDICINE

## 2018-03-05 PROCEDURE — 82530 CORTISOL FREE: CPT | Mod: 90 | Performed by: INTERNAL MEDICINE

## 2018-03-08 LAB
COLLECT DURATION TIME SPEC: 24 H
CORTIS F 24H UR HPLC-MCNC: 12.3 UG/L
CORTIS F 24H UR-MRATE: 16.6 UG/D
CORTIS F/CREAT 24H UR: 11.39 UG/G CRT
CREAT 24H UR-MCNC: 108 MG/DL
CREAT 24H UR-MRATE: 1458 MG/D (ref 700–1600)
IMP & REVIEW OF LAB RESULTS: NORMAL
SPECIMEN VOL ?TM UR: 1350 ML

## 2018-03-12 ENCOUNTER — TELEPHONE (OUTPATIENT)
Dept: NURSING | Facility: CLINIC | Age: 28
End: 2018-03-12

## 2018-03-20 ENCOUNTER — TELEPHONE (OUTPATIENT)
Dept: NURSING | Facility: CLINIC | Age: 28
End: 2018-03-20

## 2018-03-20 NOTE — TELEPHONE ENCOUNTER
Outreached patient for third and final time in regards to Health Coaching program, but wasn't able to connect.  Left final message for call back.    Torin Gross, Health    3/20/2018    2:11 PM

## 2018-03-27 PROBLEM — G43.829 MENSTRUAL MIGRAINE WITHOUT STATUS MIGRAINOSUS, NOT INTRACTABLE: Status: ACTIVE | Noted: 2018-03-27

## 2018-04-03 ENCOUNTER — OFFICE VISIT (OUTPATIENT)
Dept: ENDOCRINOLOGY | Facility: CLINIC | Age: 28
End: 2018-04-03
Payer: COMMERCIAL

## 2018-04-03 VITALS
DIASTOLIC BLOOD PRESSURE: 76 MMHG | RESPIRATION RATE: 12 BRPM | SYSTOLIC BLOOD PRESSURE: 126 MMHG | WEIGHT: 293 LBS | BODY MASS INDEX: 47.09 KG/M2 | HEART RATE: 97 BPM | OXYGEN SATURATION: 96 % | HEIGHT: 66 IN

## 2018-04-03 DIAGNOSIS — E66.01 MORBID OBESITY (H): Primary | ICD-10-CM

## 2018-04-03 PROCEDURE — 93010 ELECTROCARDIOGRAM REPORT: CPT | Performed by: INTERNAL MEDICINE

## 2018-04-03 PROCEDURE — 99214 OFFICE O/P EST MOD 30 MIN: CPT | Performed by: INTERNAL MEDICINE

## 2018-04-03 RX ORDER — PREDNISOLONE ACETATE 10 MG/ML
SUSPENSION/ DROPS OPHTHALMIC
COMMUNITY
Start: 2018-04-02 | End: 2018-08-02

## 2018-04-03 RX ORDER — PHENTERMINE HYDROCHLORIDE 37.5 MG/1
37.5 TABLET ORAL
Qty: 31 TABLET | Refills: 3 | Status: SHIPPED | OUTPATIENT
Start: 2018-04-03 | End: 2018-08-02

## 2018-04-03 NOTE — MR AVS SNAPSHOT
After Visit Summary   4/3/2018    Marlee Appiah    MRN: [id number]           Patient Information     Date Of Birth          1990        Visit Information        Provider Department      4/3/2018 4:00 PM Andree Cunningham MD [facility]        Today's Diagnoses     Morbid obesity (H)    -  1      Care Instructions    [facility]   Dr Cunningham, Endocrinology Department      [facility]   [address] [address]  Appointment [phone]  Fax: [phone]   Monday and Tuesday         [facility]   [address] [place] [address]  Appointment [phone]  Fax: [phone]  Wednesday and Thursday           Will start on Phentermine 0.5 tab/day X 1 week. Then increase to 1 tab/day (37.5 mg/day).  All side effects including risk for HTN, palpitations, ischemic events, insomnia, CP, dry mouth, risk for valvular heart disease, restlessness etc.were discussed in detailed. There is also a abuse potential and patient was instructed to use the medication as prescribed.   Use Contraception.  The patient is advised to Make better food choices: reduce carbs, Reduce portion size, weight loss and exercise 3-4 times a week.  Follow up in 3 months.          Follow-ups after your visit        Who to contact     If you have questions or need follow up information about today's clinic visit or your schedule please contact [facility] directly at [phone].  Normal or non-critical lab and imaging results will be communicated to you by MyChart, letter or phone within 4 business days after the clinic has received the results. If you do not hear from us within 7 days, please contact the clinic through [employer]t or phone. If you have a critical or abnormal lab result, we will notify you by phone as soon as possible.  Submit refill requests through [employer] or  "call your pharmacy and they will forward the refill request to us. Please allow 3 business days for your refill to be completed.          Additional Information About Your Visit        [employer] Information     [employer] gives you secure access to your electronic health record. If you see a primary care provider, you can also send messages to your care team and make appointments. If you have questions, please call your primary care clinic.  If you do not have a primary care provider, please call [phone] and they will assist you.        Care EveryWhere ID     This is your Care EveryWhere ID. This could be used by other organizations to access your [facility] medical records  [id number]        Your Vitals Were     Pulse Respirations Height Pulse Oximetry BMI (Body Mass Index)       97 12 1.676 m (5' 6\") 96% 53.96 kg/m2        Blood Pressure from Last 3 Encounters:   04/03/18 126/76   02/26/18 118/80   02/12/18 120/82    Weight from Last 3 Encounters:   04/03/18 (!) 151.6 kg (334 lb 4.8 oz)   02/26/18 (!) 153 kg (337 lb 4.8 oz)   02/12/18 (!) 148.1 kg (326 lb 9.6 oz)              We Performed the Following     EKG 12-LEAD CLINIC READ          Today's Medication Changes          These changes are accurate as of 4/3/18  4:00 PM.  If you have any questions, ask your nurse or doctor.               Start taking these medicines.        Dose/Directions    phentermine 37.5 MG tablet   Commonly known as:  ADIPEX-P   Used for:  Morbid obesity (H)   Started by:  Andree Cunningham MD        Dose:  37.5 mg   Take 1 tablet (37.5 mg) by mouth every morning (before breakfast)   Quantity:  31 tablet   Refills:  3            Where to get your medicines      Some of these will need a paper prescription and others can be bought over the counter.  Ask your nurse if you have questions.     Bring a paper prescription for each of these medications     phentermine 37.5 MG tablet                Primary Care Provider Office Phone # Fax #    " Mike Leonardo -[phone] [address] [place] [address]        Equal Access to Services     ALMA ROSAJOSESITO STEVO : Nicole leonor nicole lisseth Kearns, waashleyda luhdequan, nileta kamelbada caitie, jr martelllaura vernell. So [facility] [phone].    ATENCIÓN: Si habla español, tiene a diego disposición servicios gratuitos de asistencia lingüística. Llame al [phone].    We comply with applicable federal civil rights laws and [place] laws. We do not discriminate on the basis of race, color, national origin, age, disability, sex, sexual orientation, or gender identity.            Thank you!     Thank you for choosing [facility]  for your care. Our goal is always to provide you with excellent care. Hearing back from our patients is one way we can continue to improve our services. Please take a few minutes to complete the written survey that you may receive in the mail after your visit with us. Thank you!             Your Updated Medication List - Protect others around you: Learn how to safely use, store and throw away your medicines at www.disposemymeds.org.          This list is accurate as of 4/3/18  4:00 PM.  Always use your most recent med list.                   Brand Name Dispense Instructions for use Diagnosis    albuterol 108 (90 BASE) MCG/ACT Inhaler    PROAIR HFA/PROVENTIL HFA/VENTOLIN HFA     Inhale 2 puffs into the lungs        methocarbamol 500 MG tablet    ROBAXIN    30 tablet    Take 2 tablets (1,000 mg) by mouth 3 times daily as needed for muscle spasms    Strain of neck muscle, initial encounter       phentermine 37.5 MG tablet    ADIPEX-P    31 tablet    Take 1 tablet (37.5 mg) by mouth every morning (before breakfast)    Morbid obesity (H)       NICKOLAS-28 0.15-30 MG-MCG per tablet   Generic drug:  levonorgestrel-ethinyl estradiol      TK 1 T PO QD        prednisoLONE acetate 1 % ophthalmic susp    PRED FORTE          SUMAtriptan 25 MG tablet     IMITREX    18 tablet    Take 1-2 tablets (25-50 mg) by mouth at onset of headache for migraine May repeat in 2 hours. Max 8 tablets/24 hours.    Menstrual migraine without status migrainosus, not intractable

## 2018-04-03 NOTE — PATIENT INSTRUCTIONS
[facility]   Dr Cunningham, Endocrinology Department      [facility]   [address] [address]  Appointment [phone]  Fax: [phone]   Monday and Tuesday         [facility]   [address] [place] [address]  Appointment [phone]  Fax: [phone]  Wednesday and Thursday           Will start on Phentermine 0.5 tab/day X 1 week. Then increase to 1 tab/day (37.5 mg/day).  All side effects including risk for HTN, palpitations, ischemic events, insomnia, CP, dry mouth, risk for valvular heart disease, restlessness etc.were discussed in detailed. There is also a abuse potential and patient was instructed to use the medication as prescribed.   Use Contraception.  The patient is advised to Make better food choices: reduce carbs, Reduce portion size, weight loss and exercise 3-4 times a week.  Follow up in 3 months.

## 2018-04-03 NOTE — LETTER
4/3/2018         RE: Marlee Appiah  [email or web address]  [place] [address]        Dear Colleague,    [facility] Please see a copy of my visit note below.    Name: Marlee Appiah  Seen for f/u of obesity.  HPI:  Marlee Appiah is a 27 year old female who presents for the evaluation of obestiy.  Body mass index is 53.96 kg/(m^2).  Growing up was heavy.  Always was a heavy person.  Weight gain started: 5 years back-- depression at that time.  Highest weight as an adult: 337  lbs  Lowest weight as an adult: in high school-- 220 lbs    Normal thyroid function.  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  Did not follow-up with health  yet.    Diets tried: tried ww, santo man diet and portion controlled     Gastric bypass history: no. Does not want it 2/2 to possible s/e. Dad had it and had s/e.    Hypothyroidism: no    Use of Steroids:No  Family history of Obesity:Yes: many family members  Diet: currently pt is working on-- portion control.  Exercise:Yes: during summer more exercise. 15-30 min on elliptical 3-4 times/day. Was seen by .    Menses: regular. Not planning pregnancy.  Diarrhea/Constipation:No  Changes in Hair or Skin:No  Diabetes:No   Snores :thinks that she snores at night   HTN: no  Hyperlipidemia:No  Hirsutism:No  Easy Brusing:No    PMH/PSH:  History reviewed. No pertinent past medical history.  Past Surgical History:   Procedure Laterality Date     EYE SURGERY  03/16/2018    bilateral lasik     Family Hx:  Family History   Problem Relation Age of Onset     DIABETES Father             Social Hx:  Social History     Social History     Marital status: Single     Spouse name: N/A     Number of children: N/A     Years of education: N/A     Occupational History     Not on file.     Social History Main Topics     Smoking status: Never Smoker     Smokeless tobacco: Never Used     Alcohol use No     Drug use: No     Sexual activity: Not  "Currently     Other Topics Concern     Not on file     Social History Narrative          MEDICATIONS:  has a current medication list which includes the following prescription(s): prednisolone acetate, albuterol, sushant-28, sumatriptan, and methocarbamol.    ROS     ROS: 10 point ROS neg other than the symptoms noted above in the HPI.    Physical Exam   VS: /76 (BP Location: Right arm, Patient Position: Chair, Cuff Size: Adult Large)  Pulse 97  Resp 12  Ht 1.676 m (5' 6\")  Wt (!) 151.6 kg (334 lb 4.8 oz)  SpO2 96%  BMI 53.96 kg/m2  GENERAL: AXOX3, NAD, well dressed, answering questions appropriately, appears stated age.  HEENT: No exopthalmous, no proptosis, EOMI, no lig lag, no retraction  NECK: Thyroid normal in size, non tender, no nodules were palpated.  CV: RRR  LUNGS: CTAB  ABDOMEN: +BS  NEUROLOGY: CN grossly intact, no tremors  PSYCH: normal affect and mood      LABS:  TSH   Date Value Ref Range Status   02/26/2018 3.95 0.40 - 4.00 mU/L Final     Lab Results   Component Value Date    A1C 5.6 02/26/2018     Component      Latest Ref Rng & Units 3/5/2018   Cortisol Free Duration Urine      h 24   Volume      mL 1350   Cortisol ug/g creatinine      ug/g CRT 11.39   Cortisol Free Urine Random      ug/L 12.30   Cortisol Free Urine      <=45.0 ug/d 16.6   Creat/Vol      mg/dL 108   Creat/24hr      700 - 1600 mg/d 1458   Cortisol Free Urine Intrepretation       SEE NOTE       All pertinent notes, labs, and images personally reviewed by me.     A/P  Ms.Angela Appiah is a 27 year old here for the evaluation of obestiy:    1. Obesity-    Body mass index is 53.96 kg/(m^2).  Obesity is associated with a significant increase in mortality and risk of many disorders, including diabetes mellitus, hypertension, dyslipidemia, heart disease, stroke, sleep apnea, cancer, and many others. Conversely, weight loss is associated with a reduction in obesity-associated morbidity.  Endocrine evaluation of obesity includes " Cushing's and thyroid dysfunction.    Normal thyroid function test and 24 hour urine collection for urinary cortisol.    A1c normal.  No prediabetes or diabetes.  She is not interested in bariatric surgery.  Plan:  Start phentermine (4/3/2018).  EKG showing tachycardia and atrial enlargement.  Will start on Phentermine 0.5 tab/day X 1 week. Then increase to 1 tab/day (37.5 mg/day).  All side effects including risk for HTN, palpitations, ischemic events, insomnia, CP, dry mouth, risk for valvular heart disease, restlessness etc.were discussed in detailed. There is also a abuse potential and patient was instructed to use the medication as prescribed.   Use Contraception.  The patient is advised to Make better food choices: reduce carbs, Reduce portion size, weight loss and exercise 3-4 times a week.  Follow up in 3 months.     Discussed:  I informed the pt that:  1.Weight loss medications can be taken to assist with weight reduction when combined with appropriate healthy lifestyle changes.  2.I discussed possible s/e, risks and benefits of weight loss medications.  3.All medications are FDA approved, however, some may be used ''off label'' for their weight loss benefitIs and some ''short term'' medications can be used for longer periods to achieve desired clinical outcomes.  4.All patients taking weight loss medications must be seen in clinic for refill authorization.  Risks of obestiy discussed. Encourage healthy diet. Limit snacks, fluid calories, portions. Limit TV/computer time to one hour a day. Encourage physical activity. Recheck in six months or sooner with concerns.    Obesity is a biological preventable and treatable disease, which is associated with significantly increased risk of many acute and chronic health conditions. Obesity has now been recognized as a chronic disease by the American Medical Association.    A range of serious co-morbidities are associated with obesity including increased risk for  hypertension, stroke, coronary artery disease, dyslipidemia, Type II diabetes, depression, sleep apnea, cancers of the colon, breast and endometrium, osteoarthritis and female infertility. Therefore, obesity is not just a problem; it s a disease that warrants serious evidence based treatements.    I explained to the patient relevant work up for the diagnosis and management of obesity and discussed treatment options. Body Mass Index (BMI) has been a standard means for calculating risk for overweight and obesity. The new American Association of Clinical Endocrinology (AACE) algorithm recommends lifestyle modifications as the initial phase of treatment for all patients with the BMI equal or greater than 25 kg/m2. Lifestyle modifications includes use of medical nutrition therapy, exercise, tobacco cessation, adequate quality and quantity of sleep, limited consumption of alcohol and reduced stress through implementation of a structured, multidisciplinary program.    In patients with complications associated with obesity, graded interventions are recommended including pharmacological therapy such as phentermine, orlistat, lorcaserin and phentermine/topiramate ER, contrave ( buproprion/naltreone) and the use of very low calorie meal replacement programs.    If medical intervention is insufficient, surgical therapy may be considered, especially in patients with BMI greater than or equal to 35 kg/m2 with multiple complications. Surgical treatments include lap-band, gastric sleeve or gastric bypass surgery.      More than 50% of the time spent with Ms. Appiah on counseling / coordinating her care.  Total appointment time was 30 minutes.      Follow-up:  After above    Andree Cunningham MD  Endocrinology   [facility]    CC: Mike Leonardo Mai    Addendum to above note and clinic visit:    Labs reviewed.    See result note/telephone encounter.          Again, thank you for allowing me to participate in the  care of your patient.        Sincerely,        Andree Cunningham MD

## 2018-04-03 NOTE — PROGRESS NOTES
Name: Marlee Appiah  Seen for f/u of obesity.  HPI:  Marlee Appiah is a 27 year old female who presents for the evaluation of obestiy.  Body mass index is 53.96 kg/(m^2).  Growing up was heavy.  Always was a heavy person.  Weight gain started: 5 years back-- depression at that time.  Highest weight as an adult: 337  lbs  Lowest weight as an adult: in high school-- 220 lbs    Normal thyroid function.  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  Did not follow-up with health  yet.    Diets tried: tried ww, cave man diet and portion controlled     Gastric bypass history: no. Does not want it 2/2 to possible s/e. Dad had it and had s/e.    Hypothyroidism: no    Use of Steroids:No  Family history of Obesity:Yes: many family members  Diet: currently pt is working on-- portion control.  Exercise:Yes: during summer more exercise. 15-30 min on elliptical 3-4 times/day. Was seen by .    Menses: regular. Not planning pregnancy.  Diarrhea/Constipation:No  Changes in Hair or Skin:No  Diabetes:No   Snores :thinks that she snores at night   HTN: no  Hyperlipidemia:No  Hirsutism:No  Easy Brusing:No    PMH/PSH:  History reviewed. No pertinent past medical history.  Past Surgical History:   Procedure Laterality Date     EYE SURGERY  03/16/2018    bilateral lasik     Family Hx:  Family History   Problem Relation Age of Onset     DIABETES Father             Social Hx:  Social History     Social History     Marital status: Single     Spouse name: N/A     Number of children: N/A     Years of education: N/A     Occupational History     Not on file.     Social History Main Topics     Smoking status: Never Smoker     Smokeless tobacco: Never Used     Alcohol use No     Drug use: No     Sexual activity: Not Currently     Other Topics Concern     Not on file     Social History Narrative          MEDICATIONS:  has a current medication list which includes the following prescription(s): prednisolone acetate, albuterol, sushant-28, sumatriptan, and  "methocarbamol.    ROS     ROS: 10 point ROS neg other than the symptoms noted above in the HPI.    Physical Exam   VS: /76 (BP Location: Right arm, Patient Position: Chair, Cuff Size: Adult Large)  Pulse 97  Resp 12  Ht 1.676 m (5' 6\")  Wt (!) 151.6 kg (334 lb 4.8 oz)  SpO2 96%  BMI 53.96 kg/m2  GENERAL: AXOX3, NAD, well dressed, answering questions appropriately, appears stated age.  HEENT: No exopthalmous, no proptosis, EOMI, no lig lag, no retraction  NECK: Thyroid normal in size, non tender, no nodules were palpated.  CV: RRR  LUNGS: CTAB  ABDOMEN: +BS  NEUROLOGY: CN grossly intact, no tremors  PSYCH: normal affect and mood      LABS:  TSH   Date Value Ref Range Status   02/26/2018 3.95 0.40 - 4.00 mU/L Final     Lab Results   Component Value Date    A1C 5.6 02/26/2018     Component      Latest Ref Rng & Units 3/5/2018   Cortisol Free Duration Urine      h 24   Volume      mL 1350   Cortisol ug/g creatinine      ug/g CRT 11.39   Cortisol Free Urine Random      ug/L 12.30   Cortisol Free Urine      <=45.0 ug/d 16.6   Creat/Vol      mg/dL 108   Creat/24hr      700 - 1600 mg/d 1458   Cortisol Free Urine Intrepretation       SEE NOTE       All pertinent notes, labs, and images personally reviewed by me.     A/P  Ms.Angela Appiah is a 27 year old here for the evaluation of obestiy:    1. Obesity-    Body mass index is 53.96 kg/(m^2).  Obesity is associated with a significant increase in mortality and risk of many disorders, including diabetes mellitus, hypertension, dyslipidemia, heart disease, stroke, sleep apnea, cancer, and many others. Conversely, weight loss is associated with a reduction in obesity-associated morbidity.  Endocrine evaluation of obesity includes Cushing's and thyroid dysfunction.    Normal thyroid function test and 24 hour urine collection for urinary cortisol.    A1c normal.  No prediabetes or diabetes.  She is not interested in bariatric surgery.  Plan:  Start phentermine " (4/3/2018).  EKG showing tachycardia and atrial enlargement.  Will start on Phentermine 0.5 tab/day X 1 week. Then increase to 1 tab/day (37.5 mg/day).  All side effects including risk for HTN, palpitations, ischemic events, insomnia, CP, dry mouth, risk for valvular heart disease, restlessness etc.were discussed in detailed. There is also a abuse potential and patient was instructed to use the medication as prescribed.   Use Contraception.  The patient is advised to Make better food choices: reduce carbs, Reduce portion size, weight loss and exercise 3-4 times a week.  Follow up in 3 months.     Discussed:  I informed the pt that:  1.Weight loss medications can be taken to assist with weight reduction when combined with appropriate healthy lifestyle changes.  2.I discussed possible s/e, risks and benefits of weight loss medications.  3.All medications are FDA approved, however, some may be used ''off label'' for their weight loss benefitIs and some ''short term'' medications can be used for longer periods to achieve desired clinical outcomes.  4.All patients taking weight loss medications must be seen in clinic for refill authorization.  Risks of obestiy discussed. Encourage healthy diet. Limit snacks, fluid calories, portions. Limit TV/computer time to one hour a day. Encourage physical activity. Recheck in six months or sooner with concerns.    Obesity is a biological preventable and treatable disease, which is associated with significantly increased risk of many acute and chronic health conditions. Obesity has now been recognized as a chronic disease by the American Medical Association.    A range of serious co-morbidities are associated with obesity including increased risk for hypertension, stroke, coronary artery disease, dyslipidemia, Type II diabetes, depression, sleep apnea, cancers of the colon, breast and endometrium, osteoarthritis and female infertility. Therefore, obesity is not just a problem; it s a  disease that warrants serious evidence based treatements.    I explained to the patient relevant work up for the diagnosis and management of obesity and discussed treatment options. Body Mass Index (BMI) has been a standard means for calculating risk for overweight and obesity. The new American Association of Clinical Endocrinology (AACE) algorithm recommends lifestyle modifications as the initial phase of treatment for all patients with the BMI equal or greater than 25 kg/m2. Lifestyle modifications includes use of medical nutrition therapy, exercise, tobacco cessation, adequate quality and quantity of sleep, limited consumption of alcohol and reduced stress through implementation of a structured, multidisciplinary program.    In patients with complications associated with obesity, graded interventions are recommended including pharmacological therapy such as phentermine, orlistat, lorcaserin and phentermine/topiramate ER, contrave ( buproprion/naltreone) and the use of very low calorie meal replacement programs.    If medical intervention is insufficient, surgical therapy may be considered, especially in patients with BMI greater than or equal to 35 kg/m2 with multiple complications. Surgical treatments include lap-band, gastric sleeve or gastric bypass surgery.      More than 50% of the time spent with Ms. Appiah on counseling / coordinating her care.  Total appointment time was 30 minutes.      Follow-up:  After above    Andree Cunningham MD  Endocrinology   [facility]    CC: Mike Leonardo Mai    Addendum to above note and clinic visit:    Labs reviewed.    See result note/telephone encounter.

## 2018-05-14 ENCOUNTER — TELEPHONE (OUTPATIENT)
Dept: PEDIATRICS | Facility: CLINIC | Age: 28
End: 2018-05-14

## 2018-05-14 NOTE — TELEPHONE ENCOUNTER
Panel Management Review      Patient has the following on her problem list: None      Composite cancer screening  Chart review shows that this patient is due/due soon for the following Pap Smear  Summary:    Patient is due/failing the following:   PAP and PHYSICAL    Action needed:   Patient needs office visit for physical.    Type of outreach:    Phone, left message for patient to call back.     Questions for provider review:    None                                                                                                                                    Jolene Sullivan MA 5:56 PM 5/14/2018      Chart routed to self .

## 2018-05-14 NOTE — LETTER
May 29, 2018      Marlee Appiah  [address] [place] [id number]        Dear Marlee,       We care about your health and have reviewed your health plan including your medical conditions, medications, and lab results.  Based on this review, it is recommended that you follow up regarding the following health topic(s):  -Wellness (Physical) Visit     We recommend you take the following action(s):  -schedule a WELLNESS (Physical) APPOINTMENT.  We will perform the following labs: Lipids (fasting cholesterol - nothing to eat except water and/or meds for 8-10 hours).     Please call us at the [facility] - [phone] (or use [employer]) to address the above recommendations.     Thank you for trusting [facility] and we appreciate the opportunity to serve you.  We look forward to supporting your healthcare needs in the future.    Healthy Regards,    Your Health Care Team  [facility]      Sincerely,  Marlena Leonardo MD

## 2018-05-29 NOTE — TELEPHONE ENCOUNTER
3rd attempt. Mailed letter regarding all overdue info on HM. Closing encounter.  Jolene Sullivan MA 5:29 PM 5/29/2018

## 2018-07-27 NOTE — PATIENT INSTRUCTIONS
Weight loss:  -- start bupropion 150 mg (1 tab) daily and increase as tolerated to 2 tabs (300 mg) as tolerated  -- follow-up in 1 month for phone visit      Preventive Health Recommendations  Female Ages 26 - 39  Yearly exam:   See your health care provider every year in order to    Review health changes.     Discuss preventive care.      Review your medicines if you your doctor has prescribed any.    Until age 30: Get a Pap test every three years (more often if you have had an abnormal result).    After age 30: Talk to your doctor about whether you should have a Pap test every 3 years or have a Pap test with HPV screening every 5 years.   You do not need a Pap test if your uterus was removed (hysterectomy) and you have not had cancer.  You should be tested each year for STDs (sexually transmitted diseases), if you're at risk.   Talk to your provider about how often to have your cholesterol checked.  If you are at risk for diabetes, you should have a diabetes test (fasting glucose).  Shots: Get a flu shot each year. Get a tetanus shot every 10 years.   Nutrition:     Eat at least 5 servings of fruits and vegetables each day.    Eat whole-grain bread, whole-wheat pasta and brown rice instead of white grains and rice.    Get adequate Calcium and Vitamin D.     Lifestyle    Exercise at least 150 minutes a week (30 minutes a day, 5 days of the week). This will help you control your weight and prevent disease.    Limit alcohol to one drink per day.    No smoking.     Wear sunscreen to prevent skin cancer.    See your dentist every six months for an exam and cleaning.      Preventive Health Recommendations  Female Ages 26 - 39  Yearly exam:   See your health care provider every year in order to    Review health changes.     Discuss preventive care.      Review your medicines if you your doctor has prescribed any.    Until age 30: Get a Pap test every three years (more often if you have had an abnormal result).    After  age 30: Talk to your doctor about whether you should have a Pap test every 3 years or have a Pap test with HPV screening every 5 years.   You do not need a Pap test if your uterus was removed (hysterectomy) and you have not had cancer.  You should be tested each year for STDs (sexually transmitted diseases), if you're at risk.   Talk to your provider about how often to have your cholesterol checked.  If you are at risk for diabetes, you should have a diabetes test (fasting glucose).  Shots: Get a flu shot each year. Get a tetanus shot every 10 years.   Nutrition:     Eat at least 5 servings of fruits and vegetables each day.    Eat whole-grain bread, whole-wheat pasta and brown rice instead of white grains and rice.    Get adequate Calcium and Vitamin D.     Lifestyle    Exercise at least 150 minutes a week (30 minutes a day, 5 days of the week). This will help you control your weight and prevent disease.    Limit alcohol to one drink per day.    No smoking.     Wear sunscreen to prevent skin cancer.    See your dentist every six months for an exam and cleaning.

## 2018-07-27 NOTE — PROGRESS NOTES
SUBJECTIVE:   CC: Marlee Appiah is an 28 year old woman who presents for preventive health visit.     Physical   Annual:     Getting at least 3 servings of Calcium per day:  Yes    Bi-annual eye exam:  Yes    Dental care twice a year:  Yes    Sleep apnea or symptoms of sleep apnea:  None    Diet:  Regular (no restrictions)    Frequency of exercise:  2-3 days/week    Duration of exercise:  15-30 minutes    Taking medications regularly:  Yes    Medication side effects:  None    Additional concerns today:  No      Due for:  - Pap - Will do today, due  - HIV screen      Today's PHQ-2 Score:   PHQ-2 ( 1999 [employer]) 8/2/2018   Q1: Little interest or pleasure in doing things 0   Q2: Feeling down, depressed or hopeless 0   PHQ-2 Score 0   Q1: Little interest or pleasure in doing things Not at all   Q2: Feeling down, depressed or hopeless Not at all   PHQ-2 Score 0       Abuse: Current or Past(Physical, Sexual or Emotional)- No  Do you feel safe in your environment - Yes    Social History   Substance Use Topics     Smoking status: Never Smoker     Smokeless tobacco: Never Used     Alcohol use No     Alcohol Use 8/2/2018   If you drink alcohol do you typically have greater than 3 drinks per day OR greater than 7 drinks per week? No   No flowsheet data found.    Reviewed orders with patient.  Reviewed health maintenance and updated orders accordingly - Yes    Mammogram not appropriate for this patient based on age.    Pertinent mammograms are reviewed under the imaging tab.  History of abnormal Pap smear: NO - age 30- 65 PAP every 3 years recommended     Reviewed and updated as needed this visit by clinical staff  Tobacco  Meds  Med Hx  Surg Hx  Fam Hx  Soc Hx        Reviewed and updated as needed this visit by Provider        History reviewed. No pertinent past medical history.   Past Surgical History:   Procedure Laterality Date     EYE SURGERY  03/16/2018    bilateral lasik       Review of Systems   Constitutional:  "Negative for chills and fever.   HENT: Negative for congestion, ear pain, hearing loss and sore throat.    Eyes: Negative for pain and visual disturbance.   Respiratory: Negative for cough and shortness of breath.    Cardiovascular: Negative for chest pain, palpitations and peripheral edema.   Gastrointestinal: Negative for abdominal pain, constipation, diarrhea, heartburn, hematochezia and nausea.   Breasts:  Negative for tenderness, breast mass and discharge.   Genitourinary: Negative for dysuria, frequency, genital sores, hematuria, pelvic pain, urgency, vaginal bleeding and vaginal discharge.   Musculoskeletal: Negative for arthralgias, joint swelling and myalgias.   Skin: Negative for rash.   Neurological: Negative for dizziness, weakness, headaches and paresthesias.   Psychiatric/Behavioral: Negative for mood changes. The patient is not nervous/anxious.        OBJECTIVE:   /68 (BP Location: Right arm, Patient Position: Chair)  Pulse 96  Temp 97.9  F (36.6  C) (Oral)  Ht 5' 6\" (1.676 m)  Wt (!) 334 lb 11.2 oz (151.8 kg)  LMP 07/26/2018  SpO2 98%  BMI 54.02 kg/m2  Physical Exam  GENERAL: healthy, alert and no distress  EYES: Eyes grossly normal to inspection, PERRL and conjunctivae and sclerae normal  HENT: ear canals and TM's normal, nose and mouth without ulcers or lesions  NECK: no adenopathy, no asymmetry, masses, or scars and thyroid normal to palpation  RESP: lungs clear to auscultation - no rales, rhonchi or wheezes  CV: regular rate and rhythm, normal S1 S2, no S3 or S4, no murmur, click or rub, no peripheral edema and peripheral pulses strong  ABDOMEN: soft, nontender, no hepatosplenomegaly, no masses and bowel sounds normal  MS: no gross musculoskeletal defects noted, no edema  SKIN: no suspicious lesions or rashes  NEURO: Normal strength and tone, mentation intact and speech normal  PSYCH: mentation appears normal, affect normal/bright    Diagnostic Test Results:  none " "    ASSESSMENT/PLAN:     1. Routine general medical examination at a health care facility  Preventive health reviewed and addressed today with patient.    2. Encounter for surveillance of contraceptive pills  Refilled medications. Discussed at length risk factors of birth control including blood clots and hypertension. Discussed that birth control does NOT protect again STDs, recommend routine screening with any new partner or symptoms. Discussed alternative forms of birth control available, including IUD.  Pt wants to stay with current regimen for now.  Refilled today.  - levonorgestrel-ethinyl estradiol (ALTAVERA) 0.15-30 MG-MCG per tablet; Take 1 tablet by mouth daily  Dispense: 28 tablet; Refill: 11    3. Screening for malignant neoplasm of cervix  - Pap imaged thin layer screen reflex to HPV if ASCUS - recommend age 25 - 29    4. BMI 54  Counseled pt extensively regarding risks of obesity-related illnesses, physiology of weight loss, treatment including lifestyle modifications and medical management, including medication options, benefits, risks.  Patient not interested in weight loss clinic at this time due to busy lifestyle.  After reviewing options, she would like to try bupropion for adjunct to current lifestyle modifications (weight watchers logging and exercise regimen).  - buPROPion (WELLBUTRIN XL) 150 MG 24 hr tablet; Take 2 tablets (300 mg) by mouth every morning  Dispense: 60 tablet; Refill: 0  - phone visit in 1 month  - f/u in clinic in 3 months    COUNSELING:  Reviewed preventive health counseling, as reflected in patient instructions    BP Readings from Last 1 Encounters:   08/02/18 122/68     Estimated body mass index is 54.02 kg/(m^2) as calculated from the following:    Height as of this encounter: 5' 6\" (1.676 m).    Weight as of this encounter: 334 lb 11.2 oz (151.8 kg).      Weight management plan: Specific weight management program called bupropion discussed and follow up in 3 months in " clinic to re-evaluate.   (phone visit in 1 month)     reports that she has never smoked. She has never used smokeless tobacco.      Counseling Resources:  ATP IV Guidelines  Pooled Cohorts Equation Calculator  Breast Cancer Risk Calculator  FRAX Risk Assessment  ICSI Preventive Guidelines  Dietary Guidelines for Americans, 2010  USDA's MyPlate  ASA Prophylaxis  Lung CA Screening    Marlena Leonardo MD  [facility] ELA  Answers for HPI/ROS submitted by the patient on 8/2/2018   PHQ-2 Score: 0

## 2018-08-02 ENCOUNTER — OFFICE VISIT (OUTPATIENT)
Dept: PEDIATRICS | Facility: CLINIC | Age: 28
End: 2018-08-02
Payer: COMMERCIAL

## 2018-08-02 VITALS
WEIGHT: 293 LBS | HEART RATE: 96 BPM | TEMPERATURE: 97.9 F | SYSTOLIC BLOOD PRESSURE: 122 MMHG | BODY MASS INDEX: 47.09 KG/M2 | DIASTOLIC BLOOD PRESSURE: 68 MMHG | HEIGHT: 66 IN | OXYGEN SATURATION: 98 %

## 2018-08-02 DIAGNOSIS — Z12.4 SCREENING FOR MALIGNANT NEOPLASM OF CERVIX: ICD-10-CM

## 2018-08-02 DIAGNOSIS — Z30.41 ENCOUNTER FOR SURVEILLANCE OF CONTRACEPTIVE PILLS: ICD-10-CM

## 2018-08-02 DIAGNOSIS — Z00.00 ROUTINE GENERAL MEDICAL EXAMINATION AT A HEALTH CARE FACILITY: Primary | ICD-10-CM

## 2018-08-02 DIAGNOSIS — E66.01 MORBID OBESITY (H): ICD-10-CM

## 2018-08-02 PROCEDURE — 99395 PREV VISIT EST AGE 18-39: CPT | Performed by: INTERNAL MEDICINE

## 2018-08-02 PROCEDURE — G0145 SCR C/V CYTO,THINLAYER,RESCR: HCPCS | Performed by: INTERNAL MEDICINE

## 2018-08-02 RX ORDER — LEVONORGESTREL AND ETHINYL ESTRADIOL 0.15-0.03
1 KIT ORAL
COMMUNITY
Start: 2018-07-05 | End: 2018-08-02

## 2018-08-02 RX ORDER — BUPROPION HYDROCHLORIDE 150 MG/1
300 TABLET ORAL EVERY MORNING
Qty: 60 TABLET | Refills: 0 | Status: SHIPPED | OUTPATIENT
Start: 2018-08-02 | End: 2018-09-25

## 2018-08-02 RX ORDER — LEVONORGESTREL AND ETHINYL ESTRADIOL 0.15-0.03
1 KIT ORAL DAILY
Qty: 28 TABLET | Refills: 11 | Status: SHIPPED | OUTPATIENT
Start: 2018-08-02 | End: 2019-07-24

## 2018-08-02 ASSESSMENT — ENCOUNTER SYMPTOMS
NERVOUS/ANXIOUS: 0
ARTHRALGIAS: 0
HEMATOCHEZIA: 0
WEAKNESS: 0
HEMATURIA: 0
DIZZINESS: 0
EYE PAIN: 0
BREAST MASS: 0
CONSTIPATION: 0
CHILLS: 0
PALPITATIONS: 0
PARESTHESIAS: 0
ABDOMINAL PAIN: 0
NAUSEA: 0
COUGH: 0
SORE THROAT: 0
DIARRHEA: 0
HEADACHES: 0
HEARTBURN: 0
MYALGIAS: 0
JOINT SWELLING: 0
FEVER: 0
SHORTNESS OF BREATH: 0
FREQUENCY: 0
DYSURIA: 0

## 2018-08-02 NOTE — MR AVS SNAPSHOT
After Visit Summary   8/2/2018    Marlee Appiah    MRN: [id number]           Patient Information     Date Of Birth          1990        Visit Information        Provider Department      8/2/2018 10:50 AM Mike Leonardo Mai, MD [facility] (formerly [facility])        Today's Diagnoses     Encounter for surveillance of contraceptive pills    -  1    Routine general medical examination at a health care facility        Screening for malignant neoplasm of cervix        BMI 54          Care Instructions    Weight loss:  -- start bupropion 150 mg (1 tab) daily and increase as tolerated to 2 tabs (300 mg) as tolerated  -- follow-up in 1 month for phone visit      Preventive Health Recommendations  Female Ages 26 - 39  Yearly exam:   See your health care provider every year in order to    Review health changes.     Discuss preventive care.      Review your medicines if you your doctor has prescribed any.    Until age 30: Get a Pap test every three years (more often if you have had an abnormal result).    After age 30: Talk to your doctor about whether you should have a Pap test every 3 years or have a Pap test with HPV screening every 5 years.   You do not need a Pap test if your uterus was removed (hysterectomy) and you have not had cancer.  You should be tested each year for STDs (sexually transmitted diseases), if you're at risk.   Talk to your provider about how often to have your cholesterol checked.  If you are at risk for diabetes, you should have a diabetes test (fasting glucose).  Shots: Get a flu shot each year. Get a tetanus shot every 10 years.   Nutrition:     Eat at least 5 servings of fruits and vegetables each day.    Eat whole-grain bread, whole-wheat pasta and brown rice instead of white grains and rice.    Get adequate Calcium and Vitamin D.     Lifestyle    Exercise at least 150 minutes a week (30 minutes a day, 5 days of the week). This will help you control your weight and prevent  disease.    Limit alcohol to one drink per day.    No smoking.     Wear sunscreen to prevent skin cancer.    See your dentist every six months for an exam and cleaning.      Preventive Health Recommendations  Female Ages 26 - 39  Yearly exam:   See your health care provider every year in order to    Review health changes.     Discuss preventive care.      Review your medicines if you your doctor has prescribed any.    Until age 30: Get a Pap test every three years (more often if you have had an abnormal result).    After age 30: Talk to your doctor about whether you should have a Pap test every 3 years or have a Pap test with HPV screening every 5 years.   You do not need a Pap test if your uterus was removed (hysterectomy) and you have not had cancer.  You should be tested each year for STDs (sexually transmitted diseases), if you're at risk.   Talk to your provider about how often to have your cholesterol checked.  If you are at risk for diabetes, you should have a diabetes test (fasting glucose).  Shots: Get a flu shot each year. Get a tetanus shot every 10 years.   Nutrition:     Eat at least 5 servings of fruits and vegetables each day.    Eat whole-grain bread, whole-wheat pasta and brown rice instead of white grains and rice.    Get adequate Calcium and Vitamin D.     Lifestyle    Exercise at least 150 minutes a week (30 minutes a day, 5 days of the week). This will help you control your weight and prevent disease.    Limit alcohol to one drink per day.    No smoking.     Wear sunscreen to prevent skin cancer.    See your dentist every six months for an exam and cleaning.              Follow-ups after your visit        Follow-up notes from your care team     Return in about 3 months (around 11/2/2018).      Who to contact     If you have questions or need follow up information about today's clinic visit or your schedule please contact [facility] directly at [phone].  Normal or non-critical  "lab and imaging results will be communicated to you by [employer], letter or phone within 4 business days after the clinic has received the results. If you do not hear from us within 7 days, please contact the clinic through expressor software or phone. If you have a critical or abnormal lab result, we will notify you by phone as soon as possible.  Submit refill requests through expressor software or call your pharmacy and they will forward the refill request to us. Please allow 3 business days for your refill to be completed.          Additional Information About Your Visit        expressor software Information     expressor software gives you secure access to your electronic health record. If you see a primary care provider, you can also send messages to your care team and make appointments. If you have questions, please call your primary care clinic.  If you do not have a primary care provider, please call [phone] and they will assist you.        Care EveryWhere ID     This is your Care EveryWhere ID. This could be used by other organizations to access your [place] medical records  [id number]        Your Vitals Were     Pulse Temperature Height Last Period Pulse Oximetry BMI (Body Mass Index)    96 97.9  F (36.6  C) (Oral) 5' 6\" (1.676 m) 07/26/2018 98% 54.02 kg/m2       Blood Pressure from Last 3 Encounters:   08/02/18 122/68   04/03/18 126/76   02/26/18 118/80    Weight from Last 3 Encounters:   08/02/18 (!) 334 lb 11.2 oz (151.8 kg)   04/03/18 (!) 334 lb 4.8 oz (151.6 kg)   02/26/18 (!) 337 lb 4.8 oz (153 kg)              We Performed the Following     Pap imaged thin layer screen reflex to HPV if ASCUS - recommend age 25 - 29          Today's Medication Changes          These changes are accurate as of 8/2/18 11:52 AM.  If you have any questions, ask your nurse or doctor.               Start taking these medicines.        Dose/Directions    buPROPion 150 MG 24 hr tablet   Commonly known as:  WELLBUTRIN XL   Used for:  Morbid obesity (H)   Started " by:  Mike Leonardo Mai, MD        Dose:  300 mg   Take 2 tablets (300 mg) by mouth every morning   Quantity:  60 tablet   Refills:  0         These medicines have changed or have updated prescriptions.        Dose/Directions    levonorgestrel-ethinyl estradiol 0.15-30 MG-MCG per tablet   Commonly known as:  ALTAVERA   This may have changed:    - when to take this  - Another medication with the same name was removed. Continue taking this medication, and follow the directions you see here.   Used for:  Encounter for surveillance of contraceptive pills   Changed by:  Mike Leonardo Mai, MD        Dose:  1 tablet   Take 1 tablet by mouth daily   Quantity:  28 tablet   Refills:  11            Where to get your medicines      These medications were sent to [id number] - [place] - [address] AT [facility] [address]  [address], [place] [address]     Phone:  [phone]     buPROPion 150 MG 24 hr tablet    levonorgestrel-ethinyl estradiol 0.15-30 MG-MCG per tablet                Primary Care Provider Office Phone # Fax #    Mike Leonardo [phone] [address] [place] [address]        Equal Access to Services     [facility]: Hadii leonor nicole hadasho Soomaali, waaxda luqadaha, qaybta kaalmada adeegyada, jr matt. So [facility] [phone].    ATENCIÓN: Si habla español, tiene a diego disposición servicios gratuitos de asistencia lingüística. [facility] [phone].    We comply with applicable federal civil rights laws and [place] laws. We do not discriminate on the basis of race, color, national origin, age, disability, sex, sexual orientation, or gender identity.            Thank you!     Thank you for choosing [facility]  for your care. Our goal is always to provide you with excellent care. Hearing back from our patients is one way we can continue to improve our services. Please take a few  minutes to complete the written survey that you may receive in the mail after your visit with us. Thank you!             Your Updated Medication List - Protect others around you: Learn how to safely use, store and throw away your medicines at www.disposemymeds.org.          This list is accurate as of 8/2/18 11:52 AM.  Always use your most recent med list.                   Brand Name Dispense Instructions for use Diagnosis    albuterol 108 (90 Base) MCG/ACT Inhaler    PROAIR HFA/PROVENTIL HFA/VENTOLIN HFA     Inhale 2 puffs into the lungs        buPROPion 150 MG 24 hr tablet    WELLBUTRIN XL    60 tablet    Take 2 tablets (300 mg) by mouth every morning    Morbid obesity (H)       levonorgestrel-ethinyl estradiol 0.15-30 MG-MCG per tablet    ALTAVERA    28 tablet    Take 1 tablet by mouth daily    Encounter for surveillance of contraceptive pills       methocarbamol 500 MG tablet    ROBAXIN    30 tablet    Take 2 tablets (1,000 mg) by mouth 3 times daily as needed for muscle spasms    Strain of neck muscle, initial encounter       SUMAtriptan 25 MG tablet    IMITREX    18 tablet    Take 1-2 tablets (25-50 mg) by mouth at onset of headache for migraine May repeat in 2 hours. Max 8 tablets/24 hours.    Menstrual migraine without status migrainosus, not intractable

## 2018-08-06 LAB
COPATH REPORT: NORMAL
PAP: NORMAL

## 2018-08-28 NOTE — PROGRESS NOTES
"Marlee Appiah is a 28 year old female who is being evaluated via a telephone visit.      The patient has been notified of following:     \"This telephone visit will be conducted via a call between you and your physician/provider. We have found that certain health care needs can be provided without the need for a physical exam.  This service lets us provide the care you need with a short phone conversation.  If a prescription is necessary we can send it directly to your pharmacy.  If lab work is needed we can place an order for that and you can then stop by our lab to have the test done at a later time.    We will bill your insurance company for this service.  Please check with your medical insurance if this type of visit is covered. You may be responsible for the cost of this type of visit if insurance coverage is denied.  The typical cost is $30 (10min), $59 (11-20min) and $85 (21-30min).  Most often these visits are shorter than 10 minutes.    If during the course of the call the physician/provider feels a telephone visit is not appropriate, you will not be charged for this service.\"     Consent has been obtained for this service by care team member: yes.   See the scanned image in the medical record.    Marlee Appiah complains of  Weight Loss      I have reviewed and updated the patient's Past Medical History, Social History, Family History and Medication List.    ALLERGIES  Review of patient's allergies indicates no known allergies.    Joleen Sullivan MA 1:21 PM 8/31/2018 (MA signature)    Patient states that she is down 6 pounds and doesn't notice any side effects right now.      Additional provider notes: started bupropion - initially had dizziness in the afternoon, but now okay.  No increased agitation or anxiety, no insomnia.  Lifestyle modifications - myfitness pal and logmywalk - helpful.  Exercising 30 minutes daily.    Assessment/Plan:  1. Weight loss  Tolerating bupropion at 150 mg daily.  Will " increase to 300 mg daily on Monday.  Will update me via [employer] regarding how increase in dose is going prior to new refills.  -- will refill 300 mg tabs or 150 mg tabs x 3 months based on patient's message  -- has f/u with me in Nov      I have reviewed the note as documented above.  This accurately captures the substance of my conversation with the patient.    Total time of call between patient and provider was 6 minutes

## 2018-08-31 ENCOUNTER — VIRTUAL VISIT (OUTPATIENT)
Dept: PEDIATRICS | Facility: CLINIC | Age: 28
End: 2018-08-31
Payer: COMMERCIAL

## 2018-08-31 DIAGNOSIS — E66.01 MORBID OBESITY (H): Primary | ICD-10-CM

## 2018-08-31 PROCEDURE — 99441 ZZC PHYSICIAN TELEPHONE EVALUATION 5-10 MIN: CPT | Performed by: INTERNAL MEDICINE

## 2018-08-31 NOTE — MR AVS SNAPSHOT
After Visit Summary   8/31/2018    Marlee Appiah    MRN: [id number]           Patient Information     Date Of Birth          1990        Visit Information        Provider Department      8/31/2018 1:10 PM Mike Leonardo Mai, MD [facility]        Today's Diagnoses     Weight loss    -  1       Follow-ups after your visit        Your next 10 appointments already scheduled     Nov 09, 2018  3:50 PM CST   SHORT with Mike Leonardo MD   [facility] ([facility]    [address] [place] [address]  [address]  [facility] [address] [phone]              Who to contact     If you have questions or need follow up information about today's clinic visit or your schedule please contact [facility] directly at [phone].  Normal or non-critical lab and imaging results will be communicated to you by MyChart, letter or phone within 4 business days after the clinic has received the results. If you do not hear from us within 7 days, please contact the clinic through MyChart or phone. If you have a critical or abnormal lab result, we will notify you by phone as soon as possible.  Submit refill requests through [employer] or call your pharmacy and they will forward the refill request to us. Please allow 3 business days for your refill to be completed.          Additional Information About Your Visit        MyChart Information     LevelUp gives you secure access to your electronic health record. If you see a primary care provider, you can also send messages to your care team and make appointments. If you have questions, please call your primary care clinic.  If you do not have a primary care provider, please call [phone] and they will assist you.        Care EveryWhere ID     This is your Care EveryWhere ID. This could be used by other organizations to access your [place] medical records  [id number]         Blood Pressure from Last 3 Encounters:   08/02/18  122/68   04/03/18 126/76   02/26/18 118/80    Weight from Last 3 Encounters:   08/02/18 (!) 334 lb 11.2 oz (151.8 kg)   04/03/18 (!) 334 lb 4.8 oz (151.6 kg)   02/26/18 (!) 337 lb 4.8 oz (153 kg)              Today, you had the following     No orders found for display       Primary Care Provider Office Phone # Fax #    Mike Leonardo [phone] [address] [place] [address]        Equal Access to Services     [facility]: Hadcatalina Kearns, randi harmon, jaxon blood, jr rivera . So [facility] [phone].    ATENCIÓN: Si habla español, tiene a diego disposición servicios gratuitos de asistencia lingüística. [facility] [phone].    We comply with applicable federal civil rights laws and [place] laws. We do not discriminate on the basis of race, color, national origin, age, disability, sex, sexual orientation, or gender identity.            Thank you!     [facility]  [facility] your care. Our goal is always to provide you with excellent care. Hearing back from our patients is one way we can continue to improve our services. Please take a few minutes to complete the written survey that you may receive in the mail after your visit with us. Thank you!             Your Updated Medication List - Protect others around you: Learn how to safely use, store and throw away your medicines at www.disposemymeds.org.          This list is accurate as of 8/31/18  1:33 PM.  Always use your most recent med list.                   Brand Name Dispense Instructions for use Diagnosis    albuterol 108 (90 Base) MCG/ACT inhaler    PROAIR HFA/PROVENTIL HFA/VENTOLIN HFA     Inhale 2 puffs into the lungs        buPROPion 150 MG 24 hr tablet    WELLBUTRIN XL    60 tablet    Take 2 tablets (300 mg) by mouth every morning    Morbid obesity (H)       levonorgestrel-ethinyl estradiol 0.15-30 MG-MCG per tablet    ALTAVERA     28 tablet    Take 1 tablet by mouth daily    Encounter for surveillance of contraceptive pills       methocarbamol 500 MG tablet    ROBAXIN    30 tablet    Take 2 tablets (1,000 mg) by mouth 3 times daily as needed for muscle spasms    Strain of neck muscle, initial encounter       SUMAtriptan 25 MG tablet    IMITREX    18 tablet    Take 1-2 tablets (25-50 mg) by mouth at onset of headache for migraine May repeat in 2 hours. Max 8 tablets/24 hours.    Menstrual migraine without status migrainosus, not intractable

## 2018-09-17 ENCOUNTER — MYC MEDICAL ADVICE (OUTPATIENT)
Dept: PEDIATRICS | Facility: CLINIC | Age: 28
End: 2018-09-17

## 2018-09-25 DIAGNOSIS — E66.01 MORBID OBESITY (H): ICD-10-CM

## 2018-09-26 NOTE — TELEPHONE ENCOUNTER
"Requested Prescriptions   Pending Prescriptions Disp Refills     buPROPion (WELLBUTRIN XL) 150 MG 24 hr tablet  Last Written Prescription Date:  8/2/2018  Last Fill Quantity: 60 tablet,  # refills: 0   Last office visit: 8/31/2018 with prescribing provider:  Mike Leonardo Mai   Future Office Visit:   Next 5 appointments (look out 90 days)     Nov 09, 2018  3:50 PM CST   SHORT with Mike Leonardo MD   [facility]    [address] [place] [address]   [phone]                  60 tablet 0     Sig: Take 2 tablets (300 mg) by mouth every morning    SSRIs Protocol Passed    9/25/2018  8:31 PM  No flowsheet data found.  No flowsheet data found.             Passed - Recent (12 mo) or future (30 days) visit within the authorizing provider's specialty    Patient had office visit in the last 12 months or has a visit in the next 30 days with authorizing provider or within the authorizing provider's specialty.  See \"Patient Info\" tab in inbasket, or \"Choose Columns\" in Meds & Orders section of the refill encounter.           Passed - Medication is Bupropion    If the medication is Bupropion (Wellbutrin), and the patient is taking for smoking cessation; OK to refill.         Passed - Patient is age 18 or older       Passed - No active pregnancy on record       Passed - No positive pregnancy test in last 12 months          "

## 2018-09-26 NOTE — TELEPHONE ENCOUNTER
Routing refill request to provider for review/approval because:  Drug not on the [facility] refill protocol for high BMI  Evelyn CORRALES RN

## 2018-09-27 RX ORDER — BUPROPION HYDROCHLORIDE 300 MG/1
300 TABLET ORAL EVERY MORNING
Qty: 30 TABLET | Refills: 11 | Status: SHIPPED | OUTPATIENT
Start: 2018-09-27 | End: 2018-11-09

## 2018-11-05 ENCOUNTER — OFFICE VISIT (OUTPATIENT)
Dept: PEDIATRICS | Facility: CLINIC | Age: 28
End: 2018-11-05
Payer: COMMERCIAL

## 2018-11-05 VITALS
WEIGHT: 293 LBS | BODY MASS INDEX: 47.09 KG/M2 | TEMPERATURE: 97.6 F | HEIGHT: 66 IN | SYSTOLIC BLOOD PRESSURE: 120 MMHG | HEART RATE: 110 BPM | DIASTOLIC BLOOD PRESSURE: 80 MMHG | OXYGEN SATURATION: 95 %

## 2018-11-05 DIAGNOSIS — J45.21 MILD INTERMITTENT ASTHMA WITH EXACERBATION: ICD-10-CM

## 2018-11-05 DIAGNOSIS — J01.01 ACUTE RECURRENT MAXILLARY SINUSITIS: Primary | ICD-10-CM

## 2018-11-05 PROCEDURE — 99214 OFFICE O/P EST MOD 30 MIN: CPT | Performed by: PHYSICIAN ASSISTANT

## 2018-11-05 NOTE — MR AVS SNAPSHOT
After Visit Summary   11/5/2018    Marlee Appiah    MRN: [id number]           Patient Information     Date Of Birth          1990        Visit Information        Provider Department      11/5/2018 7:50 AM Wiliam Monahan [facility]C [facility]        Today's Diagnoses     Acute recurrent maxillary sinusitis    -  1    Mild intermittent asthma with exacerbation          Care Instructions    Begin antibiotics--take with food  Increase albuterol to three times daily   Return if lungs worsen          Follow-ups after your visit        Your next 10 appointments already scheduled     Nov 09, 2018  3:50 PM CST   SHORT with Mike [facility]ardo MD   [facility]    [address]  [address]  [facility] [address] [phone]              Who to contact     If you have questions or need follow up information about today's clinic visit or your schedule please contact [facility] directly at [phone].  Normal or non-critical lab and imaging results will be communicated to you by MyChart, letter or phone within 4 business days after the clinic has received the results. If you do not hear from us within 7 days, please contact the clinic through [employer] or phone. If you have a critical or abnormal lab result, we will notify you by phone as soon as possible.  Submit refill requests through [employer]ADEUM or call your pharmacy and they will forward the refill request to us. Please allow 3 business days for your refill to be completed.          Additional Information About Your Visit        MyChart Information     RADEUM gives you secure access to your electronic health record. If you see a primary care provider, you can also send messages to your care team and make appointments. If you have questions, please call your primary care clinic.  If you do not have a primary care provider, please call [phone] and they will assist you.    "     Care EveryWhere ID     This is your Care EveryWhere ID. This could be used by other organizations to access your [place] medical records  [id number]        Your Vitals Were     Pulse Temperature Height Pulse Oximetry BMI (Body Mass Index)       110 97.6  F (36.4  C) (Tympanic) 5' 6\" (1.676 m) 95% 51.25 kg/m2        Blood Pressure from Last 3 Encounters:   11/05/18 120/80   08/02/18 122/68   04/03/18 126/76    Weight from Last 3 Encounters:   11/05/18 317 lb 8 oz (144 kg)   08/02/18 (!) 334 lb 11.2 oz (151.8 kg)   04/03/18 (!) 334 lb 4.8 oz (151.6 kg)              Today, you had the following     No orders found for display         Today's Medication Changes          These changes are accurate as of 11/5/18  8:01 AM.  If you have any questions, ask your nurse or doctor.               Start taking these medicines.        Dose/Directions    amoxicillin-clavulanate 875-125 MG per tablet   Commonly known as:  AUGMENTIN   Used for:  Acute recurrent maxillary sinusitis   Started by:  Wiliam Monahan PA-C        Dose:  1 tablet   Take 1 tablet by mouth 2 times daily   Quantity:  20 tablet   Refills:  0            Where to get your medicines      These medications were sent to [id number] - [place] - [address]  [address], [place] [address]     Phone:  [phone]     amoxicillin-clavulanate 875-125 MG per tablet                Primary Care Provider Office Phone # Fax #    Mike Leonardo [phone] [address] [place] [address]        Equal Access to Services     [facility] AH: Nicole Kearns, randi harmon, nileta kaalmada caitie, jr matt. So [facility] [phone].    ATENCIÓN: Si habla español, tiene a diego disposición servicios gratuitos de asistencia lingüística. Llame al [phone].    We comply with applicable federal civil rights laws " and [place] laws. We do not discriminate on the basis of race, color, national origin, age, disability, sex, sexual orientation, or gender identity.            Thank you!     Thank you for choosing [facility] [place]  for your care. Our goal is always to provide you with excellent care. Hearing back from our patients is one way we can continue to improve our services. Please take a few minutes to complete the written survey that you may receive in the mail after your visit with us. Thank you!             Your Updated Medication List - Protect others around you: Learn how to safely use, store and throw away your medicines at www.disposemymeds.org.          This list is accurate as of 11/5/18  8:01 AM.  Always use your most recent med list.                   Brand Name Dispense Instructions for use Diagnosis    albuterol 108 (90 Base) MCG/ACT inhaler    PROAIR HFA/PROVENTIL HFA/VENTOLIN HFA     Inhale 2 puffs into the lungs        amoxicillin-clavulanate 875-125 MG per tablet    AUGMENTIN    20 tablet    Take 1 tablet by mouth 2 times daily    Acute recurrent maxillary sinusitis       buPROPion 300 MG 24 hr tablet    WELLBUTRIN XL    30 tablet    Take 1 tablet (300 mg) by mouth every morning    Morbid obesity (H)       levonorgestrel-ethinyl estradiol 0.15-30 MG-MCG per tablet    ALTAVERA    28 tablet    Take 1 tablet by mouth daily    Encounter for surveillance of contraceptive pills       methocarbamol 500 MG tablet    ROBAXIN    30 tablet    Take 2 tablets (1,000 mg) by mouth 3 times daily as needed for muscle spasms    Strain of neck muscle, initial encounter       SUMAtriptan 25 MG tablet    IMITREX    18 tablet    Take 1-2 tablets (25-50 mg) by mouth at onset of headache for migraine May repeat in 2 hours. Max 8 tablets/24 hours.    Menstrual migraine without status migrainosus, not intractable

## 2018-11-05 NOTE — PATIENT INSTRUCTIONS
Begin antibiotics--take with food  Increase albuterol to three times daily   Return if lungs worsen

## 2018-11-05 NOTE — PROGRESS NOTES
"  SUBJECTIVE:   Marlee Appiah is a 28 year old female who presents to clinic today for the following health issues:    Acute Illness   Acute illness concerns: sinus  Onset: 5 days    Fever: possible    Chills/Sweats: YES- bnoth    Headache (location?): YES    Sinus Pressure:YES- post-nasal drainage    Conjunctivitis:  no    Ear Pain: no    Rhinorrhea: YES- yellow/green    Congestion: YES- nasal and chest    Sore Throat: YES     Cough: YES-productive of clear sputum, productive of yellow sputum    Wheeze: no    Decreased Appetite: no     Nausea: YES    Vomiting: no     Diarrhea:  no     Dysuria/Freq.: no     Fatigue/Achiness: YES- fatigue    Sick/Strep Exposure: YES- family     Therapies Tried and outcome: nyquil, sudafed   Work: .   History of asthma--twice daily lately. No history of allergies.   History of sinusitis.   Nonsmoker.    ROS:  ROS otherwise negative    OBJECTIVE:                                                    /80 (BP Location: Right arm, Cuff Size: Adult Large)  Pulse 110  Temp 97.6  F (36.4  C) (Tympanic)  Ht 5' 6\" (1.676 m)  Wt 317 lb 8 oz (144 kg)  SpO2 95%  BMI 51.25 kg/m2  Body mass index is 51.25 kg/(m^2).   GENERAL: alert, no distress  HENT: ear canals- normal; TMs- normal; Nose- normal; Mouth- no ulcers, no lesions; max sinus tenderness  NECK: tonsillar LAD  RESP: diminished breath sounds throughout; wheezing present  CV: regular rates and rhythm, normal S1 S2, no S3 or S4 and no murmur, no click or rub    Diagnostic test results:  No results found for this or any previous visit (from the past 24 hour(s)).     ASSESSMENT/PLAN:                                                    (J01.01) Acute recurrent maxillary sinusitis  (primary encounter diagnosis)  Comment: begin antibiotics as directed.   Plan: amoxicillin-clavulanate (AUGMENTIN) 875-125 MG         per tablet            (J45.21) Mild intermittent asthma with exacerbation  Comment: increase albuterol to three " times daily. Return if symptoms persist or worsen.  Plan:       Wiliam Monahan PA-C  [facility]

## 2018-11-06 NOTE — PROGRESS NOTES
SUBJECTIVE:   Marlee Appiah is a 28 year old female who presents to clinic today for the following health issues:    Patient is here today to follow up on weight loss. Last OV: 8/2/2018. Patient states: Her Wellbutrin helps curb her hunger and cuts down on the amount of food she eats. SHe's also suffering a sinus infection she would like to recheck/discuss.    RESPIRATORY SYMPTOMS      Duration: 1.5 weeks    Description  nasal congestion, sore throat, facial pain/pressure, cough, fatigue/malaise and hoarse voice    Severity: moderate    Accompanying signs and symptoms: SOB at times, increased sweating.    History (predisposing factors):  Seen Kimberlee on 11/5/2018    Precipitating or alleviating factors: None    Therapies tried and outcome:  Amoxi (given on 11/5/2018) still taking.    Wt Readings from Last 4 Encounters:   11/05/18 317 lb 8 oz (144 kg)   08/02/18 (!) 334 lb 11.2 oz (151.8 kg)   04/03/18 (!) 334 lb 4.8 oz (151.6 kg)   02/26/18 (!) 337 lb 4.8 oz (153 kg)           Problem list and histories reviewed & adjusted, as indicated.  Additional history: as documented    Patient Active Problem List   Diagnosis     Morbid obesity (H)     Menstrual migraine without status migrainosus, not intractable     Past Surgical History:   Procedure Laterality Date     EYE SURGERY  03/16/2018    bilateral lasik       Social History   Substance Use Topics     Smoking status: Never Smoker     Smokeless tobacco: Never Used     Alcohol use No     Family History   Problem Relation Age of Onset     Diabetes Father          Current Outpatient Prescriptions   Medication Sig Dispense Refill     albuterol (PROAIR HFA/PROVENTIL HFA/VENTOLIN HFA) 108 (90 BASE) MCG/ACT Inhaler Inhale 2 puffs into the lungs       amoxicillin-clavulanate (AUGMENTIN) 875-125 MG per tablet Take 1 tablet by mouth 2 times daily 20 tablet 0     buPROPion (WELLBUTRIN XL) 300 MG 24 hr tablet Take 1 tablet (300 mg) by mouth every morning 90 tablet 11      "levonorgestrel-ethinyl estradiol (ALTAVERA) 0.15-30 MG-MCG per tablet Take 1 tablet by mouth daily 28 tablet 11     predniSONE (DELTASONE) 20 MG tablet Take 2 tablets (40 mg) by mouth daily for 5 days Fill if symptoms worsen or no improvement with symptomatic treatment. 10 tablet 0     methocarbamol (ROBAXIN) 500 MG tablet Take 2 tablets (1,000 mg) by mouth 3 times daily as needed for muscle spasms (Patient not taking: Reported on 11/9/2018) 30 tablet 1     SUMAtriptan (IMITREX) 25 MG tablet Take 1-2 tablets (25-50 mg) by mouth at onset of headache for migraine May repeat in 2 hours. Max 8 tablets/24 hours. (Patient not taking: Reported on 11/9/2018) 18 tablet 1     [DISCONTINUED] buPROPion (WELLBUTRIN XL) 300 MG 24 hr tablet Take 1 tablet (300 mg) by mouth every morning 30 tablet 11     No Known Allergies    Reviewed and updated as needed this visit by clinical staff       Reviewed and updated as needed this visit by Provider         ROS:  Constitutional, HEENT, cardiovascular, pulmonary, gi and gu systems are negative, except as otherwise noted.    OBJECTIVE:     /72  Pulse 66  Temp 97.6  F (36.4  C) (Oral)  Ht 5' 6\" (1.676 m)  Wt 317 lb 3.2 oz (143.9 kg)  SpO2 99%  BMI 51.2 kg/m2  Body mass index is 51.2 kg/(m^2).  GENERAL: healthy, alert and no distress  EYES: Eyes grossly normal to inspection, PERRL and conjunctivae and sclerae normal  HENT: normal cephalic/atraumatic, ear canals and TM's normal, nasal mucosa edematous , oral mucous membranes moist and tonsillar erythema  NECK: no adenopathy, no asymmetry, masses, or scars and thyroid normal to palpation  RESP: lungs clear to auscultation - no rales, rhonchi or wheezes  CV: regular rate and rhythm, normal S1 S2, no S3 or S4, no murmur, click or rub, no peripheral edema and peripheral pulses strong  ABDOMEN: soft, nontender, no hepatosplenomegaly, no masses and bowel sounds normal  MS: no gross musculoskeletal defects noted, no edema    Diagnostic " Test Results:  none     ASSESSMENT/PLAN:         1. BMI 54  Follow-up after initiation medication for weight loss.  Has lost about 5% body weight in 3 months.  Will continue lifestyle modifications and refill meds.  F/u next year for physical or sooner if needed.  Pt oferred additional resources including PT, nutrition referral, and weight loss clinic referral, but deferred for now.  - buPROPion (WELLBUTRIN XL) 300 MG 24 hr tablet; Take 1 tablet (300 mg) by mouth every morning  Dispense: 90 tablet; Refill: 11    2. Acute recurrent maxillary sinusitis  On a course of augmentin (day 5) with no improvement.  I explained this is likely viral.  Asthma is well controlled with rescue inhaler (lungs clear).  Discussed benefits and risks of systemic steroids and provided script to fill if she gets worse or does not improve.  In mean time, will try nasal steroid spray and netti pot.  Instructed to finish abx to completion.  - predniSONE (DELTASONE) 20 MG tablet; Take 2 tablets (40 mg) by mouth daily for 5 days Fill if symptoms worsen or no improvement with symptomatic treatment.  Dispense: 10 tablet; Refill: 0    Patient Instructions   Great job with your lifestyle changes and weight loss!  I have refilled bupropion for a year.    Please reach out to me if you would like additional support.    For sinusitis/cold symptoms, continue antibiotics as prescribed.    Try using nasal steroid spray twice daily in addition.    Netti pot may help too.      Fill script for course of steroids if symptoms do not improve with symptomatic treatment.    Someone will check in on your asthma symptoms in about 2 weeks.      Marlena Leonardo MD  [facility]

## 2018-11-09 ENCOUNTER — OFFICE VISIT (OUTPATIENT)
Dept: PEDIATRICS | Facility: CLINIC | Age: 28
End: 2018-11-09
Payer: COMMERCIAL

## 2018-11-09 VITALS
WEIGHT: 293 LBS | HEART RATE: 66 BPM | HEIGHT: 66 IN | BODY MASS INDEX: 47.09 KG/M2 | SYSTOLIC BLOOD PRESSURE: 118 MMHG | OXYGEN SATURATION: 99 % | TEMPERATURE: 97.6 F | DIASTOLIC BLOOD PRESSURE: 72 MMHG

## 2018-11-09 DIAGNOSIS — G43.829 MENSTRUAL MIGRAINE WITHOUT STATUS MIGRAINOSUS, NOT INTRACTABLE: ICD-10-CM

## 2018-11-09 DIAGNOSIS — J01.01 ACUTE RECURRENT MAXILLARY SINUSITIS: ICD-10-CM

## 2018-11-09 DIAGNOSIS — E66.01 MORBID OBESITY (H): Primary | ICD-10-CM

## 2018-11-09 PROCEDURE — 99213 OFFICE O/P EST LOW 20 MIN: CPT | Performed by: INTERNAL MEDICINE

## 2018-11-09 RX ORDER — PREDNISONE 20 MG/1
40 TABLET ORAL DAILY
Qty: 10 TABLET | Refills: 0 | Status: SHIPPED | OUTPATIENT
Start: 2018-11-09 | End: 2018-11-14

## 2018-11-09 RX ORDER — BUPROPION HYDROCHLORIDE 300 MG/1
300 TABLET ORAL EVERY MORNING
Qty: 90 TABLET | Refills: 11 | Status: SHIPPED | OUTPATIENT
Start: 2018-11-09 | End: 2019-11-29

## 2018-11-09 NOTE — PATIENT INSTRUCTIONS
Great job with your lifestyle changes and weight loss!  I have refilled bupropion for a year.    Please reach out to me if you would like additional support.    For sinusitis/cold symptoms, continue antibiotics as prescribed.    Try using nasal steroid spray twice daily in addition.    Netti pot may help too.      Fill script for course of steroids if symptoms do not improve with symptomatic treatment.    Someone will check in on your asthma symptoms in about 2 weeks.

## 2018-11-09 NOTE — MR AVS SNAPSHOT
After Visit Summary   11/9/2018    Marlee Appiah    MRN: [id number]           Patient Information     Date Of Birth          1990        Visit Information        Provider Department      11/9/2018 3:50 PM Mkie Leonardo Mai, MD [facility]        Today's Diagnoses     BMI 54    -  1    Acute recurrent maxillary sinusitis          Care Instructions    Great job with your lifestyle changes and weight loss!  I have refilled bupropion for a year.    Please reach out to me if you would like additional support.    For sinusitis/cold symptoms, continue antibiotics as prescribed.    Try using nasal steroid spray twice daily in addition.    Netti pot may help too.      Fill script for course of steroids if symptoms do not improve with symptomatic treatment.    Someone will check in on your asthma symptoms in about 2 weeks.          Follow-ups after your visit        Follow-up notes from your care team     Return in about 9 months (around 8/9/2019).      Who to contact     If you have questions or need follow up information about today's clinic visit or your schedule please contact [facility] directly at [phone].  Normal or non-critical lab and imaging results will be communicated to you by [employer], letter or phone within 4 business days after the clinic has received the results. If you do not hear from us within 7 days, please contact the clinic through [employer] or phone. If you have a critical or abnormal lab result, we will notify you by phone as soon as possible.  Submit refill requests through [employer] or call your pharmacy and they will forward the refill request to us. Please allow 3 business days for your refill to be completed.          Additional Information About Your Visit        MyChart Information     [employer] gives you secure access to your electronic health record. If you see a primary care provider, you can also send messages to your care team and make  "appointments. If you have questions, please call your primary care clinic.  If you do not have a primary care provider, please call [phone] and they will assist you.        Care EveryWhere ID     This is your Care EveryWhere ID. This could be used by other organizations to access your [facility] medical records  [id number]        Your Vitals Were     Pulse Temperature Height Pulse Oximetry BMI (Body Mass Index)       66 97.6  F (36.4  C) (Oral) 5' 6\" (1.676 m) 99% 51.2 kg/m2        Blood Pressure from Last 3 Encounters:   11/09/18 118/72   11/05/18 120/80   08/02/18 122/68    Weight from Last 3 Encounters:   11/09/18 317 lb 3.2 oz (143.9 kg)   11/05/18 317 lb 8 oz (144 kg)   08/02/18 (!) 334 lb 11.2 oz (151.8 kg)              Today, you had the following     No orders found for display         Today's Medication Changes          These changes are accurate as of 11/9/18  4:19 PM.  If you have any questions, ask your nurse or doctor.               Start taking these medicines.        Dose/Directions    predniSONE 20 MG tablet   Commonly known as:  DELTASONE   Used for:  Acute recurrent maxillary sinusitis   Started by:  Mike Leonardo Mai, MD        Dose:  40 mg   Take 2 tablets (40 mg) by mouth daily for 5 days Fill if symptoms worsen or no improvement with symptomatic treatment.   Quantity:  10 tablet   Refills:  0            Where to get your medicines      [id number]  [place] - [address] AT [facility] [address]  [facility] [place] [address]     Phone:  [phone]     buPROPion 300 MG 24 hr tablet         Some of these will need a paper prescription and others can be bought over the counter.  Ask your nurse if you have questions.     Bring a paper prescription for each of these medications     predniSONE 20 MG tablet                Primary Care Provider Office Phone # Fax #    Mike Leonardo [phone]       " [address] [place] [address]        Equal Access to Services     [facility] STEVO : Hadii aad ku haddebbiefreya Kearns, waashleymini harmon, qamelindaluis sheehanmajr rushjodylilliana matt. So [facility] [phone].    ATENCIÓN: Si habla español, tiene a diego disposición servicios gratuitos de asistencia lingüística. [facility] [phone].    We comply with applicable federal civil rights laws and [place] laws. We do not discriminate on the basis of race, color, national origin, age, disability, sex, sexual orientation, or gender identity.            Thank you!     Thank you for choosing [facility] [place]  for your care. Our goal is always to provide you with excellent care. Hearing back from our patients is one way we can continue to improve our services. Please take a few minutes to complete the written survey that you may receive in the mail after your visit with us. Thank you!             Your Updated Medication List - Protect others around you: Learn how to safely use, store and throw away your medicines at www.disposemymeds.org.          This list is accurate as of 11/9/18  4:19 PM.  Always use your most recent med list.                   Brand Name Dispense Instructions for use Diagnosis    albuterol 108 (90 Base) MCG/ACT inhaler    PROAIR HFA/PROVENTIL HFA/VENTOLIN HFA     Inhale 2 puffs into the lungs        amoxicillin-clavulanate 875-125 MG per tablet    AUGMENTIN    20 tablet    Take 1 tablet by mouth 2 times daily    Acute recurrent maxillary sinusitis       buPROPion 300 MG 24 hr tablet    WELLBUTRIN XL    90 tablet    Take 1 tablet (300 mg) by mouth every morning    Morbid obesity (H)       levonorgestrel-ethinyl estradiol 0.15-30 MG-MCG per tablet    ALTAVERA    28 tablet    Take 1 tablet by mouth daily    Encounter for surveillance of contraceptive pills       methocarbamol 500 MG tablet    ROBAXIN    30 tablet    Take 2 tablets (1,000 mg) by mouth 3 times daily as needed  for muscle spasms    Strain of neck muscle, initial encounter       predniSONE 20 MG tablet    DELTASONE    10 tablet    Take 2 tablets (40 mg) by mouth daily for 5 days Fill if symptoms worsen or no improvement with symptomatic treatment.    Acute recurrent maxillary sinusitis       SUMAtriptan 25 MG tablet    IMITREX    18 tablet    Take 1-2 tablets (25-50 mg) by mouth at onset of headache for migraine May repeat in 2 hours. Max 8 tablets/24 hours.    Menstrual migraine without status migrainosus, not intractable

## 2018-11-09 NOTE — LETTER
My Asthma Action Plan  Name: Marlee Appiah   YOB: 1990  Date: 11/9/2018   My doctor: Marlena Leonardo MD   My clinic: [facility]        My Control Medicine: None  My Rescue Medicine: Albuterol (Proair/Ventolin/Proventil) inhaler as needed   My Asthma Severity: intermittent  Avoid your asthma triggers: Patient aware of triggers.               GREEN ZONE   Good Control    I feel good    No cough or wheeze    Can work, sleep and play without asthma symptoms       Take your asthma control medicine every day.     1. If exercise triggers your asthma, take your rescue medication    15 minutes before exercise or sports, and    During exercise if you have asthma symptoms  2. Spacer to use with inhaler: If you have a spacer, make sure to use it with your inhaler             YELLOW ZONE Getting Worse  I have ANY of these:    I do not feel good    Cough or wheeze    Chest feels tight    Wake up at night   1. Keep taking your Green Zone medications  2. Start taking your rescue medicine:    every 20 minutes for up to 1 hour. Then every 4 hours for 24-48 hours.  3. If you stay in the Yellow Zone for more than 12-24 hours, contact your doctor.  4. If you do not return to the Green Zone in 12-24 hours or you get worse, start taking your oral steroid medicine if prescribed by your provider.           RED ZONE Medical Alert - Get Help  I have ANY of these:    I feel awful    Medicine is not helping    Breathing getting harder    Trouble walking or talking    Nose opens wide to breathe       1. Take your rescue medicine NOW  2. If your provider has prescribed an oral steroid medicine, start taking it NOW  3. Call your doctor NOW  4. If you are still in the Red Zone after 20 minutes and you have not reached your doctor:    Take your rescue medicine again and    Call 911 or go to the emergency room right away    See your regular doctor within 2 weeks of an Emergency Room or Urgent Care visit for follow-up  treatment.          Annual Reminders:  Meet with Asthma Educator,  Flu Shot in the Fall, consider Pneumonia Vaccination for patients with asthma (aged 19 and older).    Pharmacy: [facility] [id number] [place] [address] [address] [facility]                      Asthma Triggers  How To Control Things That Make Your Asthma Worse    Triggers are things that make your asthma worse.  Look at the list below to help you find your triggers and what you can do about them.  You can help prevent asthma flare-ups by staying away from your triggers.      Trigger                                                          What you can do   Cigarette Smoke  Tobacco smoke can make asthma worse. Do not allow smoking in your home, car or around you.  Be sure no one smokes at a child s day care or school.  If you smoke, ask your health care provider for ways to help you quit.  Ask family members to quit too.  Ask your health care provider for a referral to Quit Plan to help you quit smoking, or call 1-912-860-PLAN.     Colds, Flu, Bronchitis  These are common triggers of asthma. Wash your hands often.  Don t touch your eyes, nose or mouth.  Get a flu shot every year.     Dust Mites  These are tiny bugs that live in cloth or carpet. They are too small to see. Wash sheets and blankets in hot water every week.   Encase pillows and mattress in dust mite proof covers.  Avoid having carpet if you can. If you have carpet, vacuum weekly.   Use a dust mask and HEPA vacuum.   Pollen and Outdoor Mold  Some people are allergic to trees, grass, or weed pollen, or molds. Try to keep your windows closed.  Limit time out doors when pollen count is high.   Ask you health care provider about taking medicine during allergy season.     Animal Dander  Some people are allergic to skin flakes, urine or saliva from pets with fur or feathers. Keep pets with fur or feathers out of your home.    If you can t keep the pet outdoors,  then keep the pet out of your bedroom.  Keep the bedroom door closed.  Keep pets off cloth furniture and away from stuffed toys.     Mice, Rats, and Cockroaches  Some people are allergic to the waste from these pests.   Cover food and garbage.  Clean up spills and food crumbs.  Store grease in the refrigerator.   Keep food out of the bedroom.   Indoor Mold  This can be a trigger if your home has high moisture. Fix leaking faucets, pipes, or other sources of water.   Clean moldy surfaces.  Dehumidify basement if it is damp and smelly.   Smoke, Strong Odors, and Sprays  These can reduce air quality. Stay away from strong odors and sprays, such as perfume, powder, hair spray, paints, smoke incense, paint, cleaning products, candles and new carpet.   Exercise or Sports  Some people with asthma have this trigger. Be active!  Ask your doctor about taking medicine before sports or exercise to prevent symptoms.    Warm up for 5-10 minutes before and after sports or exercise.     Other Triggers of Asthma  Cold air:  Cover your nose and mouth with a scarf.  Sometimes laughing or crying can be a trigger.  Some medicines and food can trigger asthma.

## 2018-11-10 ASSESSMENT — ASTHMA QUESTIONNAIRES: ACT_TOTALSCORE: 12

## 2018-12-28 DIAGNOSIS — J45.909 ASTHMA, UNSPECIFIED ASTHMA SEVERITY, UNSPECIFIED WHETHER COMPLICATED, UNSPECIFIED WHETHER PERSISTENT: Primary | ICD-10-CM

## 2018-12-28 NOTE — TELEPHONE ENCOUNTER
"Requested Prescriptions   Pending Prescriptions Disp Refills     albuterol (PROAIR HFA/PROVENTIL HFA/VENTOLIN HFA) 108 (90 Base) MCG/ACT inhaler  Last Written Prescription Date:  04/04/2017  Last Fill Quantity: na,  # refills: na   Last office visit: 11/9/2018 with prescribing provider:  Mike Leonardo Mai, MD    Future Office Visit:           Sig: Inhale 2 puffs into the lungs    Asthma Maintenance Inhalers - Anticholinergics Passed - 12/28/2018 12:40 PM       Passed - Patient is age 12 years or older       Passed - Recent (12 mo) or future (30 days) visit within the authorizing provider's specialty    Patient had office visit in the last 12 months or has a visit in the next 30 days with authorizing provider or within the authorizing provider's specialty.  See \"Patient Info\" tab in inbasket, or \"Choose Columns\" in Meds & Orders section of the refill encounter.             Routing refill request to provider for review/approval because:  Medication is reported/historical         "

## 2018-12-31 NOTE — TELEPHONE ENCOUNTER
ACT Total Scores 11/9/2018   ACT TOTAL SCORE (Goal Greater than or Equal to 20) 12   In the past 12 months, how many times did you visit the emergency room for your asthma without being admitted to the hospital? 0   In the past 12 months, how many times were you hospitalized overnight because of your asthma? 0         Routing refill request to provider for review/approval because:  Medication is reported/historical: new patient to provider  ACT < FMG protocol for RN fill

## 2019-01-03 RX ORDER — ALBUTEROL SULFATE 90 UG/1
2 AEROSOL, METERED RESPIRATORY (INHALATION) EVERY 4 HOURS PRN
Qty: 1 INHALER | Refills: 0 | Status: SHIPPED | OUTPATIENT
Start: 2019-01-03 | End: 2019-09-10

## 2019-01-03 NOTE — TELEPHONE ENCOUNTER
I am approving refill for albuterol, however pt's asthma not well controlled at our last visit - please call and ask her if she would be willing to make a sooner follow-up appointment to assess her respiratory status and make sure he asthma is better controlled.    Marlena Leonardo MD

## 2019-03-01 PROBLEM — G43.829 MENSTRUAL MIGRAINE WITHOUT STATUS MIGRAINOSUS, NOT INTRACTABLE: Status: ACTIVE | Noted: 2018-11-09

## 2019-03-26 PROBLEM — J45.21 MILD INTERMITTENT ASTHMA WITH EXACERBATION: Status: ACTIVE | Noted: 2019-03-26

## 2019-03-26 PROBLEM — J45.21 MILD INTERMITTENT ASTHMA WITH EXACERBATION: Status: ACTIVE | Noted: 2018-11-05

## 2019-07-22 DIAGNOSIS — Z30.41 ENCOUNTER FOR SURVEILLANCE OF CONTRACEPTIVE PILLS: ICD-10-CM

## 2019-07-24 RX ORDER — LEVONORGESTREL AND ETHINYL ESTRADIOL 0.15-0.03
1 KIT ORAL DAILY
Qty: 28 TABLET | Refills: 3 | Status: SHIPPED | OUTPATIENT
Start: 2019-07-24 | End: 2019-11-29

## 2019-07-25 ENCOUNTER — OFFICE VISIT (OUTPATIENT)
Dept: PEDIATRICS | Facility: CLINIC | Age: 29
End: 2019-07-25
Payer: COMMERCIAL

## 2019-07-25 VITALS
TEMPERATURE: 98.7 F | HEART RATE: 96 BPM | RESPIRATION RATE: 20 BRPM | BODY MASS INDEX: 45.48 KG/M2 | OXYGEN SATURATION: 99 % | DIASTOLIC BLOOD PRESSURE: 76 MMHG | HEIGHT: 67 IN | WEIGHT: 289.8 LBS | SYSTOLIC BLOOD PRESSURE: 128 MMHG

## 2019-07-25 DIAGNOSIS — R07.0 THROAT PAIN: ICD-10-CM

## 2019-07-25 DIAGNOSIS — J06.9 VIRAL URI WITH COUGH: Primary | ICD-10-CM

## 2019-07-25 LAB
DEPRECATED S PYO AG THROAT QL EIA: NORMAL
SPECIMEN SOURCE: NORMAL

## 2019-07-25 PROCEDURE — 87081 CULTURE SCREEN ONLY: CPT | Performed by: FAMILY MEDICINE

## 2019-07-25 PROCEDURE — 87880 STREP A ASSAY W/OPTIC: CPT | Performed by: FAMILY MEDICINE

## 2019-07-25 PROCEDURE — 99213 OFFICE O/P EST LOW 20 MIN: CPT | Performed by: FAMILY MEDICINE

## 2019-07-25 RX ORDER — BENZONATATE 100 MG/1
100 CAPSULE ORAL 3 TIMES DAILY PRN
Qty: 21 CAPSULE | Refills: 1 | Status: SHIPPED | OUTPATIENT
Start: 2019-07-25 | End: 2020-01-09

## 2019-07-25 ASSESSMENT — MIFFLIN-ST. JEOR: SCORE: 2074.15

## 2019-07-25 NOTE — PATIENT INSTRUCTIONS
Delsym/robitussin use every 6- 8 hours as needed for cough  -- honey remedies are helpful (lozenges or honey in warm water)  -- tessalon every 8 hours as needed    Stay hydrated: 4 water bottles a day    Expect improvement in the next 7-10 days      Call if developing fevers, shortness of breath, pain with breathing, dizziness      Cetirizine is the generic name for zyrtec

## 2019-07-25 NOTE — PROGRESS NOTES
"Subjective:   Marlee Appiah is a 29 year old female who presents for   Chief Complaint   Patient presents with     URI     start 4 days ago sx sore throat, hard to swallow, sometimes productive cough with yellow flem, sometimes dry cough, hot flashes, nausea, nasal and chest congestion, short of breath, hoarse tx inhaler, Zyrtec and Advil      No fevers recorded at home. No other sick contacts, denies vomiting/diarrhea or rashes  Sleep hasn't been very good due to sleep. She has not tried any specific cold medications.   She has cough with phlegm. No facial pain/pressure.   She has hx of asthma. She has been using albuterol but thinks it has made her throat feel worse.     SH: non-smoker    Patient Active Problem List    Diagnosis Date Noted     Menstrual migraine without status migrainosus, not intractable 11/09/2018     Priority: Medium     Mild intermittent asthma with exacerbation 11/05/2018     Priority: Medium     Morbid obesity (H) 02/26/2018     Priority: Medium     Body mass index is 54.44 kg/(m^2).           Current Outpatient Medications   Medication     albuterol (PROAIR HFA/PROVENTIL HFA/VENTOLIN HFA) 108 (90 Base) MCG/ACT inhaler     benzonatate (TESSALON) 100 MG capsule     buPROPion (WELLBUTRIN XL) 300 MG 24 hr tablet     levonorgestrel-ethinyl estradiol (ALTAVERA) 0.15-30 MG-MCG tablet     methocarbamol (ROBAXIN) 500 MG tablet     SUMAtriptan (IMITREX) 25 MG tablet     No current facility-administered medications for this visit.      ROS:  As above per HPI    Objective:   /76 (BP Location: Right arm, Patient Position: Chair, Cuff Size: Adult Large)   Pulse 96   Temp 98.7  F (37.1  C) (Oral)   Resp 20   Ht 1.705 m (5' 7.13\")   Wt 131.5 kg (289 lb 12.8 oz)   SpO2 99%   BMI 45.22 kg/m  , Body mass index is 45.22 kg/m .  Gen:  NAD, well-nourished, sitting in chair comfortably  HEENT: EOMI, sclera anicteric, Head normocephalic, ; nares patent; moist mucous membranes, 3+ left tonsil 2+ right " tonsil no exudates, no trismus  Neck: trachea midline, no thyromegaly, no lymphadenopathy  CV:  Hemodynamically stable, RRR  Pulm:  no increased work of breathing , CTAB, no wheezes/rales/rhonchi   Extrem: no cyanosis, edema or clubbing  Skin: no obvious rashes or abnormalities  Psych: Euthymic, linear thoughts, normal rate of speech    Results for orders placed or performed in visit on 07/25/19   Strep, Rapid Screen   Result Value Ref Range    Specimen Description Throat     Rapid Strep A Screen       NEGATIVE: No Group A streptococcal antigen detected by immunoassay, await culture report.       Assessment & Plan:   Marlee Appiah, 29 year old female who presents with:  Viral URI with cough  Tessalon and DM recommended for cough. LIkely a viral illness at this time and recommended supportive cares with expectations this should improve in 7-10 days. Near perfect O2 saturation and without fever, doubt pneumonia at this time.   - benzonatate (TESSALON) 100 MG capsule  Dispense: 21 capsule; Refill: 1    Throat pain  Strep test negative, presume viral illness. Will f/u culture  - Strep, Rapid Screen  - Beta strep group A culture      Bradley Braxton MD   [facility]    The use of Dragon/MyLikes dictation services may have been used to construct the content in this note; any grammatical or spelling errors are non-intentional. Please contact the author of this note directly if you are in need of any clarification.

## 2019-07-26 LAB
BACTERIA SPEC CULT: NORMAL
SPECIMEN SOURCE: NORMAL

## 2019-09-09 DIAGNOSIS — J45.909 ASTHMA, UNSPECIFIED ASTHMA SEVERITY, UNSPECIFIED WHETHER COMPLICATED, UNSPECIFIED WHETHER PERSISTENT: ICD-10-CM

## 2019-09-09 NOTE — TELEPHONE ENCOUNTER
"Requested Prescriptions   Pending Prescriptions Disp Refills     albuterol (PROAIR HFA/PROVENTIL HFA/VENTOLIN HFA) 108 (90 Base) MCG/ACT inhaler    Last Written Prescription Date:  1/3/2019  Last Fill Quantity: 1,  # refills: 0   Last office visit: 7/25/2019 with prescribing provider:  Mike Leonardo Mai     Future Office Visit:     1 Inhaler 0     Sig: Inhale 2 puffs into the lungs every 4 hours as needed for shortness of breath / dyspnea or wheezing       Asthma Maintenance Inhalers - Anticholinergics Failed - 9/9/2019  9:51 AM        Failed - Asthma control assessment score within normal limits in last 6 months     Please review ACT score.     ACT Total Scores 11/9/2018   ACT TOTAL SCORE (Goal Greater than or Equal to 20) 12   In the past 12 months, how many times did you visit the emergency room for your asthma without being admitted to the hospital? 0   In the past 12 months, how many times were you hospitalized overnight because of your asthma? 0               Passed - Patient is age 12 years or older        Passed - Medication is active on med list        Passed - Recent (6 mo) or future (30 days) visit within the authorizing provider's specialty     Patient had office visit in the last 6 months or has a visit in the next 30 days with authorizing provider or within the authorizing provider's specialty.  See \"Patient Info\" tab in inbasket, or \"Choose Columns\" in Meds & Orders section of the refill encounter.              "

## 2019-09-10 RX ORDER — ALBUTEROL SULFATE 90 UG/1
2 AEROSOL, METERED RESPIRATORY (INHALATION) EVERY 4 HOURS PRN
Qty: 1 INHALER | Refills: 0 | Status: SHIPPED | OUTPATIENT
Start: 2019-09-10 | End: 2020-01-27

## 2019-09-10 NOTE — TELEPHONE ENCOUNTER
Routing refill request to provider for review/approval because:  Not current:  ACT   Skye Robertson RN

## 2019-09-11 NOTE — TELEPHONE ENCOUNTER
Judith refill provided - due for annual physical around 11/2019 - will provide further refills at f/u visit.  In mean time, can we please get ACT over phone (can offer to mail physical copy first)

## 2019-09-12 NOTE — TELEPHONE ENCOUNTER
Left message for patient to return call. She needs to schedule appointment in November.     I mailed ACT to her home address. Will call early next week to review answers.    ML AGUILAR MA on 9/12/2019 at 4:15 PM

## 2019-09-18 ASSESSMENT — ASTHMA QUESTIONNAIRES: ACT_TOTALSCORE: 22

## 2019-10-25 ENCOUNTER — TRANSFERRED RECORDS (OUTPATIENT)
Dept: HEALTH INFORMATION MANAGEMENT | Facility: CLINIC | Age: 29
End: 2019-10-25

## 2019-11-26 ENCOUNTER — PRE VISIT (OUTPATIENT)
Dept: PEDIATRICS | Facility: CLINIC | Age: 29
End: 2019-11-26

## 2019-11-26 ASSESSMENT — ENCOUNTER SYMPTOMS
SORE THROAT: 0
MYALGIAS: 0
JOINT SWELLING: 0
HEADACHES: 0
EYE PAIN: 0
CONSTIPATION: 0
WEAKNESS: 0
ABDOMINAL PAIN: 0
DYSURIA: 0
DIARRHEA: 0
BREAST MASS: 0
CHILLS: 0
HEMATURIA: 0
SHORTNESS OF BREATH: 0
ARTHRALGIAS: 0
HEMATOCHEZIA: 0
HEARTBURN: 0
NERVOUS/ANXIOUS: 0
PARESTHESIAS: 0
FREQUENCY: 0
NAUSEA: 0
COUGH: 0
DIZZINESS: 0
PALPITATIONS: 0
FEVER: 0

## 2019-11-26 NOTE — TELEPHONE ENCOUNTER
Pre-Visit Planning     Future Appointments   Date Time Provider Department Center   11/29/2019  1:45 PM Mike Leonardo Mai, MD [facility]     Arrival Time for this Appointment:    Appointment Notes for this encounter:   Data Unavailable    Questionnaires Reviewed/Assigned  No additional questionnaires are needed       Patient preferred phone number: [phone]    Unable to reach. Left voicemail.

## 2019-11-29 ENCOUNTER — OFFICE VISIT (OUTPATIENT)
Dept: PEDIATRICS | Facility: CLINIC | Age: 29
End: 2019-11-29
Payer: COMMERCIAL

## 2019-11-29 VITALS
OXYGEN SATURATION: 98 % | HEART RATE: 74 BPM | TEMPERATURE: 97.7 F | WEIGHT: 293 LBS | BODY MASS INDEX: 47.09 KG/M2 | HEIGHT: 66 IN | SYSTOLIC BLOOD PRESSURE: 124 MMHG | DIASTOLIC BLOOD PRESSURE: 78 MMHG | RESPIRATION RATE: 16 BRPM

## 2019-11-29 DIAGNOSIS — Z00.00 ROUTINE GENERAL MEDICAL EXAMINATION AT A HEALTH CARE FACILITY: Primary | ICD-10-CM

## 2019-11-29 DIAGNOSIS — E78.2 MIXED HYPERLIPIDEMIA: ICD-10-CM

## 2019-11-29 DIAGNOSIS — L63.9 ALOPECIA AREATA: ICD-10-CM

## 2019-11-29 DIAGNOSIS — E66.01 MORBID OBESITY (H): ICD-10-CM

## 2019-11-29 DIAGNOSIS — Z30.41 ENCOUNTER FOR SURVEILLANCE OF CONTRACEPTIVE PILLS: ICD-10-CM

## 2019-11-29 PROCEDURE — 99213 OFFICE O/P EST LOW 20 MIN: CPT | Mod: 25 | Performed by: INTERNAL MEDICINE

## 2019-11-29 PROCEDURE — 99395 PREV VISIT EST AGE 18-39: CPT | Performed by: INTERNAL MEDICINE

## 2019-11-29 RX ORDER — BETAMETHASONE VALERATE 1.2 MG/G
AEROSOL, FOAM TOPICAL
Qty: 50 G | Refills: 1 | Status: SHIPPED | OUTPATIENT
Start: 2019-11-29 | End: 2022-02-28

## 2019-11-29 RX ORDER — BUPROPION HYDROCHLORIDE 300 MG/1
300 TABLET ORAL EVERY MORNING
Qty: 90 TABLET | Refills: 11 | Status: SHIPPED | OUTPATIENT
Start: 2019-11-29 | End: 2019-12-27

## 2019-11-29 RX ORDER — LEVONORGESTREL AND ETHINYL ESTRADIOL 0.15-0.03
1 KIT ORAL DAILY
Qty: 28 TABLET | Refills: 3 | Status: SHIPPED | OUTPATIENT
Start: 2019-11-29 | End: 2020-03-19

## 2019-11-29 ASSESSMENT — ENCOUNTER SYMPTOMS
ARTHRALGIAS: 0
MYALGIAS: 0
PARESTHESIAS: 0
DYSURIA: 0
NAUSEA: 0
PALPITATIONS: 0
NERVOUS/ANXIOUS: 0
DIARRHEA: 0
HEMATURIA: 0
CHILLS: 0
WEAKNESS: 0
SORE THROAT: 0
ABDOMINAL PAIN: 0
COUGH: 0
HEMATOCHEZIA: 0
DIZZINESS: 0
BREAST MASS: 0
HEARTBURN: 0
HEADACHES: 0
SHORTNESS OF BREATH: 0
EYE PAIN: 0
JOINT SWELLING: 0
FREQUENCY: 0
CONSTIPATION: 0
FEVER: 0

## 2019-11-29 ASSESSMENT — MIFFLIN-ST. JEOR: SCORE: 2093.9

## 2019-11-29 NOTE — PATIENT INSTRUCTIONS
Continue medications as you are.  For alopecia areata, I will place an order for steroid cream and send you instructions via [facility].  Will forward results of labs when available.    Preventive Health Recommendations  Female Ages 26 - 39  Yearly exam:   See your health care provider every year in order to    Review health changes.     Discuss preventive care.      Review your medicines if you your doctor has prescribed any.    Until age 30: Get a Pap test every three years (more often if you have had an abnormal result).    After age 30: Talk to your doctor about whether you should have a Pap test every 3 years or have a Pap test with HPV screening every 5 years.   You do not need a Pap test if your uterus was removed (hysterectomy) and you have not had cancer.  You should be tested each year for STDs (sexually transmitted diseases), if you're at risk.   Talk to your provider about how often to have your cholesterol checked.  If you are at risk for diabetes, you should have a diabetes test (fasting glucose).  Shots: Get a flu shot each year. Get a tetanus shot every 10 years.   Nutrition:     Eat at least 5 servings of fruits and vegetables each day.    Eat whole-grain bread, whole-wheat pasta and brown rice instead of white grains and rice.    Get adequate Calcium and Vitamin D.     Lifestyle    Exercise at least 150 minutes a week (30 minutes a day, 5 days of the week). This will help you control your weight and prevent disease.    Limit alcohol to one drink per day.    No smoking.     Wear sunscreen to prevent skin cancer.    See your dentist every six months for an exam and cleaning.

## 2019-11-29 NOTE — PROGRESS NOTES
SUBJECTIVE:   CC: Marlee Appiah is an 29 year old woman who presents for preventive health visit.     Healthy Habits:     Getting at least 3 servings of Calcium per day:  Yes    Bi-annual eye exam:  Yes    Dental care twice a year:  Yes    Sleep apnea or symptoms of sleep apnea:  None    Diet:  Regular (no restrictions)    Frequency of exercise:  4-5 days/week    Duration of exercise:  45-60 minutes    Taking medications regularly:  Yes    Medication side effects:  None    PHQ-2 Total Score: 0    Additional concerns today:  No    Asthma Follow-Up    Was ACT completed today?    Yes    ACT Total Scores 11/29/2019   ACT TOTAL SCORE (Goal Greater than or Equal to 20) 23   In the past 12 months, how many times did you visit the emergency room for your asthma without being admitted to the hospital? 0   In the past 12 months, how many times were you hospitalized overnight because of your asthma? 0       How many days per week do you miss taking your asthma controller medication?  I do not have an asthma controller medication    Please describe any recent triggers for your asthma: animal dander and exercise or sports    Have you had any Emergency Room Visits, Urgent Care Visits, or Hospital Admissions since your last office visit?  No        Medication Followup of OCP - needs refill request 90 day supply     Taking Medication as prescribed: yes    Side Effects:  None    Medication Helping Symptoms:  yes       Hair thinning - worried about possible folliculitis because scalp feels inflamed      Duration: years, but has noticed red spots recently    Description (location/character/radiation): has noticed red spots on scalp. Has had thin hair        History (similar episodes/previous evaluation): has been seen by derm in the past dx with alopecia     Has been diagnosed with alopecia in past by dermatology with biopsy.  Had injection treatment but did not tolerate well.  Improved on own.  Now noticing some thinning in  certain areas of scalp with underlying flaky skin.      Today's PHQ-2 Score:   PHQ-2 ( 1999 Pfizer) 11/26/2019   Q1: Little interest or pleasure in doing things 0   Q2: Feeling down, depressed or hopeless 0   PHQ-2 Score 0   Q1: Little interest or pleasure in doing things Not at all   Q2: Feeling down, depressed or hopeless Not at all   PHQ-2 Score 0       Abuse: Current or Past(Physical, Sexual or Emotional)- No  Do you feel safe in your environment? Yes        Social History     Tobacco Use     Smoking status: Never Smoker     Smokeless tobacco: Never Used   Substance Use Topics     Alcohol use: No     Comment: rarely     If you drink alcohol do you typically have >3 drinks per day or >7 drinks per week? No    Alcohol Use 11/26/2019   Prescreen: >3 drinks/day or >7 drinks/week? No   Prescreen: >3 drinks/day or >7 drinks/week? -       Reviewed orders with patient.  Reviewed health maintenance and updated orders accordingly - Yes  Lab work is in process  Labs reviewed in EPIC  BP Readings from Last 3 Encounters:   11/29/19 124/78   07/25/19 128/76   11/09/18 118/72    Wt Readings from Last 3 Encounters:   11/29/19 135 kg (297 lb 9.6 oz)   07/25/19 131.5 kg (289 lb 12.8 oz)   11/09/18 143.9 kg (317 lb 3.2 oz)                  Patient Active Problem List   Diagnosis     Morbid obesity (H)     Menstrual migraine without status migrainosus, not intractable     Mild intermittent asthma with exacerbation     Past Surgical History:   Procedure Laterality Date     EYE SURGERY  03/16/2018    bilateral lasik       Social History     Tobacco Use     Smoking status: Never Smoker     Smokeless tobacco: Never Used   Substance Use Topics     Alcohol use: No     Comment: rarely     Family History   Problem Relation Age of Onset     Diabetes Father          Current Outpatient Medications   Medication Sig Dispense Refill     albuterol (PROAIR HFA/PROVENTIL HFA/VENTOLIN HFA) 108 (90 Base) MCG/ACT inhaler Inhale 2 puffs into the lungs  every 4 hours as needed for shortness of breath / dyspnea or wheezing 1 Inhaler 0     betamethasone valerate 0.12 % FOAM Apply to affected area on scalp twice daily for 2 weeks. 50 g 1     buPROPion (WELLBUTRIN XL) 300 MG 24 hr tablet Take 1 tablet (300 mg) by mouth every morning 90 tablet 11     levonorgestrel-ethinyl estradiol (ALTAVERA) 0.15-30 MG-MCG tablet Take 1 tablet by mouth daily 28 tablet 3     benzonatate (TESSALON) 100 MG capsule Take 1 capsule (100 mg) by mouth 3 times daily as needed for cough 21 capsule 1     SUMAtriptan (IMITREX) 25 MG tablet Take 1-2 tablets (25-50 mg) by mouth at onset of headache for migraine May repeat in 2 hours. Max 8 tablets/24 hours. 18 tablet 1     No Known Allergies  Recent Labs   Lab Test 02/26/18  1630   A1C 5.6   TSH 3.95            Pertinent mammograms are reviewed under the imaging tab.  History of abnormal Pap smear: NO - age 30- 65 PAP every 3 years recommended  PAP / HPV 8/2/2018   PAP NIL     Reviewed and updated as needed this visit by clinical staff  Tobacco  Allergies  Med Hx  Surg Hx  Fam Hx  Soc Hx        Reviewed and updated as needed this visit by Provider            Review of Systems   Constitutional: Negative for chills and fever.   HENT: Negative for congestion, ear pain, hearing loss and sore throat.    Eyes: Negative for pain and visual disturbance.   Respiratory: Negative for cough and shortness of breath.    Cardiovascular: Negative for chest pain, palpitations and peripheral edema.   Gastrointestinal: Negative for abdominal pain, constipation, diarrhea, heartburn, hematochezia and nausea.   Breasts:  Negative for tenderness, breast mass and discharge.   Genitourinary: Negative for dysuria, frequency, genital sores, hematuria, pelvic pain, urgency, vaginal bleeding and vaginal discharge.   Musculoskeletal: Negative for arthralgias, joint swelling and myalgias.   Skin: Negative for rash.   Neurological: Negative for dizziness, weakness, headaches  "and paresthesias.   Psychiatric/Behavioral: Negative for mood changes. The patient is not nervous/anxious.         OBJECTIVE:   /78 (BP Location: Right arm, Patient Position: Sitting, Cuff Size: Adult Large)   Pulse 74   Temp 97.7  F (36.5  C) (Oral)   Resp 16   Ht 1.68 m (5' 6.14\")   Wt 135 kg (297 lb 9.6 oz)   LMP 11/08/2019   SpO2 98%   Breastfeeding No   BMI 47.83 kg/m    Physical Exam  GENERAL: healthy, alert and no distress  EYES: Eyes grossly normal to inspection, PERRL and conjunctivae and sclerae normal  HENT: ear canals and TM's normal, nose and mouth without ulcers or lesions  NECK: no adenopathy, no asymmetry, masses, or scars and thyroid normal to palpation  RESP: lungs clear to auscultation - no rales, rhonchi or wheezes  CV: regular rate and rhythm, normal S1 S2, no S3 or S4, no murmur, click or rub, no peripheral edema and peripheral pulses strong  ABDOMEN: soft, nontender, no hepatosplenomegaly, no masses and bowel sounds normal  MS: no gross musculoskeletal defects noted, no edema  SKIN: no suspicious lesions or rashes  NEURO: Normal strength and tone, mentation intact and speech normal  PSYCH: mentation appears normal, affect normal/bright    Diagnostic Test Results:  Labs reviewed in Epic    ASSESSMENT/PLAN:       ICD-10-CM    1. Routine general medical examination at a health care facility Z00.00 Lipid panel reflex to direct LDL Fasting     **Comprehensive metabolic panel FUTURE anytime     **TSH with free T4 reflex FUTURE anytime     Vitamin D Deficiency   2. BMI 54 E66.01 buPROPion (WELLBUTRIN XL) 300 MG 24 hr tablet   3. Encounter for surveillance of contraceptive pills Z30.41 levonorgestrel-ethinyl estradiol (ALTAVERA) 0.15-30 MG-MCG tablet   4. Alopecia areata  - see [employer] message sent today. L63.9 betamethasone valerate 0.12 % FOAM     Patient Instructions   Continue medications as you are.  For alopecia areata, I will place an order for steroid cream and send you " "instructions via [facility].  Will forward results of labs when available.    Preventive Health Recommendations  Female Ages 26 - 39  Yearly exam:   See your health care provider every year in order to    Review health changes.     Discuss preventive care.      Review your medicines if you your doctor has prescribed any.    Until age 30: Get a Pap test every three years (more often if you have had an abnormal result).    After age 30: Talk to your doctor about whether you should have a Pap test every 3 years or have a Pap test with HPV screening every 5 years.   You do not need a Pap test if your uterus was removed (hysterectomy) and you have not had cancer.  You should be tested each year for STDs (sexually transmitted diseases), if you're at risk.   Talk to your provider about how often to have your cholesterol checked.  If you are at risk for diabetes, you should have a diabetes test (fasting glucose).  Shots: Get a flu shot each year. Get a tetanus shot every 10 years.   Nutrition:     Eat at least 5 servings of fruits and vegetables each day.    Eat whole-grain bread, whole-wheat pasta and brown rice instead of white grains and rice.    Get adequate Calcium and Vitamin D.     Lifestyle    Exercise at least 150 minutes a week (30 minutes a day, 5 days of the week). This will help you control your weight and prevent disease.    Limit alcohol to one drink per day.    No smoking.     Wear sunscreen to prevent skin cancer.    See your dentist every six months for an exam and cleaning.            COUNSELING:  Reviewed preventive health counseling, as reflected in patient instructions    Estimated body mass index is 47.83 kg/m  as calculated from the following:    Height as of this encounter: 1.68 m (5' 6.14\").    Weight as of this encounter: 135 kg (297 lb 9.6 oz).    Weight management plan: Discussed healthy diet and exercise guidelines  Started kick boxing class.  Goes 4 times per week.  Is trying to go 5 times per " week.  Has lost weight since.       reports that she has never smoked. She has never used smokeless tobacco.      Counseling Resources:  ATP IV Guidelines  Pooled Cohorts Equation Calculator  Breast Cancer Risk Calculator  FRAX Risk Assessment  ICSI Preventive Guidelines  Dietary Guidelines for Americans, 2010  USDA's MyPlate  ASA Prophylaxis  Lung CA Screening    Marlena Leonardo MD  [facility]

## 2019-11-29 NOTE — LETTER
My Asthma Action Plan    Name: Marlee Appiah   YOB: 1990  Date: 11/29/2019   My doctor: Marlena Leonardo MD   [facility]        My Rescue Medicine:   Albuterol inhaler (Proair/Ventolin/Proventil HFA)  2-4 puffs EVERY 4 HOURS as needed. Use a spacer if recommended by your provider.   My Asthma Severity:   Intermittent / Exercise Induced  Know your asthma triggers: animal dander and exercise or sports             GREEN ZONE   Good Control    I feel good    No cough or wheeze    Can work, sleep and play without asthma symptoms       Take your asthma control medicine every day.     1. If exercise triggers your asthma, take your rescue medication    15 minutes before exercise or sports, and    During exercise if you have asthma symptoms  2. Spacer to use with inhaler: If you have a spacer, make sure to use it with your inhaler             YELLOW ZONE Getting Worse  I have ANY of these:    I do not feel good    Cough or wheeze    Chest feels tight    Wake up at night   1. Keep taking your Green Zone medications  2. Start taking your rescue medicine:    every 20 minutes for up to 1 hour. Then every 4 hours for 24-48 hours.  3. If you stay in the Yellow Zone for more than 12-24 hours, contact your doctor.  4. If you do not return to the Green Zone in 12-24 hours or you get worse, start taking your oral steroid medicine if prescribed by your provider.           RED ZONE Medical Alert - Get Help  I have ANY of these:    I feel awful    Medicine is not helping    Breathing getting harder    Trouble walking or talking    Nose opens wide to breathe       1. Take your rescue medicine NOW  2. If your provider has prescribed an oral steroid medicine, start taking it NOW  3. Call your doctor NOW  4. If you are still in the Red Zone after 20 minutes and you have not reached your doctor:    Take your rescue medicine again and    Call 911 or go to the emergency room right away    See your  regular doctor within 2 weeks of an Emergency Room or Urgent Care visit for follow-up treatment.          Annual Reminders:  Meet with Asthma Educator,  Flu Shot in the Fall, consider Pneumonia Vaccination for patients with asthma (aged 19 and older).    Pharmacy: [id number] - [place], [place] - [id number] [facility] [address]                        Asthma Triggers  How To Control Things That Make Your Asthma Worse    Triggers are things that make your asthma worse.  Look at the list below to help you find your triggers and   what you can do about them. You can help prevent asthma flare-ups by staying away from your triggers.      Trigger                                                          What you can do   Cigarette Smoke  Tobacco smoke can make asthma worse. Do not allow smoking in your home, car or around you.  Be sure no one smokes at a child s day care or school.  If you smoke, ask your health care provider for ways to help you quit.  Ask family members to quit too.  Ask your health care provider for a referral to Quit Plan to help you quit smoking, or call 1-094-923-PLAN.     Colds, Flu, Bronchitis  These are common triggers of asthma. Wash your hands often.  Don t touch your eyes, nose or mouth.  Get a flu shot every year.     Dust Mites  These are tiny bugs that live in cloth or carpet. They are too small to see. Wash sheets and blankets in hot water every week.   Encase pillows and mattress in dust mite proof covers.  Avoid having carpet if you can. If you have carpet, vacuum weekly.   Use a dust mask and HEPA vacuum.   Pollen and Outdoor Mold  Some people are allergic to trees, grass, or weed pollen, or molds. Try to keep your windows closed.  Limit time out doors when pollen count is high.   Ask you health care provider about taking medicine during allergy season.     Animal Dander  Some people are allergic to skin flakes, urine or saliva from pets with fur or feathers.  Keep pets with fur or feathers out of your home.    If you can t keep the pet outdoors, then keep the pet out of your bedroom.  Keep the bedroom door closed.  Keep pets off cloth furniture and away from stuffed toys.     Mice, Rats, and Cockroaches  Some people are allergic to the waste from these pests.   Cover food and garbage.  Clean up spills and food crumbs.  Store grease in the refrigerator.   Keep food out of the bedroom.   Indoor Mold  This can be a trigger if your home has high moisture. Fix leaking faucets, pipes, or other sources of water.   Clean moldy surfaces.  Dehumidify basement if it is damp and smelly.   Smoke, Strong Odors, and Sprays  These can reduce air quality. Stay away from strong odors and sprays, such as perfume, powder, hair spray, paints, smoke incense, paint, cleaning products, candles and new carpet.   Exercise or Sports  Some people with asthma have this trigger. Be active!  Ask your doctor about taking medicine before sports or exercise to prevent symptoms.    Warm up for 5-10 minutes before and after sports or exercise.     Other Triggers of Asthma  Cold air:  Cover your nose and mouth with a scarf.  Sometimes laughing or crying can be a trigger.  Some medicines and food can trigger asthma.

## 2019-11-30 ASSESSMENT — ASTHMA QUESTIONNAIRES: ACT_TOTALSCORE: 23

## 2019-12-07 DIAGNOSIS — Z00.00 ROUTINE GENERAL MEDICAL EXAMINATION AT A HEALTH CARE FACILITY: ICD-10-CM

## 2019-12-07 LAB
ALBUMIN SERPL-MCNC: 3.3 G/DL (ref 3.4–5)
ALP SERPL-CCNC: 45 U/L (ref 40–150)
ALT SERPL W P-5'-P-CCNC: 21 U/L (ref 0–50)
ANION GAP SERPL CALCULATED.3IONS-SCNC: 7 MMOL/L (ref 3–14)
AST SERPL W P-5'-P-CCNC: 11 U/L (ref 0–45)
BILIRUB SERPL-MCNC: 0.4 MG/DL (ref 0.2–1.3)
BUN SERPL-MCNC: 11 MG/DL (ref 7–30)
CALCIUM SERPL-MCNC: 9.2 MG/DL (ref 8.5–10.1)
CHLORIDE SERPL-SCNC: 109 MMOL/L (ref 94–109)
CHOLEST SERPL-MCNC: 262 MG/DL
CO2 SERPL-SCNC: 23 MMOL/L (ref 20–32)
CREAT SERPL-MCNC: 0.72 MG/DL (ref 0.52–1.04)
GFR SERPL CREATININE-BSD FRML MDRD: >90 ML/MIN/{1.73_M2}
GLUCOSE SERPL-MCNC: 86 MG/DL (ref 70–99)
HDLC SERPL-MCNC: 38 MG/DL
LDLC SERPL CALC-MCNC: 196 MG/DL
NONHDLC SERPL-MCNC: 224 MG/DL
POTASSIUM SERPL-SCNC: 4.9 MMOL/L (ref 3.4–5.3)
PROT SERPL-MCNC: 7.2 G/DL (ref 6.8–8.8)
SODIUM SERPL-SCNC: 139 MMOL/L (ref 133–144)
TRIGL SERPL-MCNC: 142 MG/DL
TSH SERPL DL<=0.005 MIU/L-ACNC: 2.71 MU/L (ref 0.4–4)

## 2019-12-07 PROCEDURE — 84443 ASSAY THYROID STIM HORMONE: CPT | Performed by: INTERNAL MEDICINE

## 2019-12-07 PROCEDURE — 80053 COMPREHEN METABOLIC PANEL: CPT | Performed by: INTERNAL MEDICINE

## 2019-12-07 PROCEDURE — 80061 LIPID PANEL: CPT | Performed by: INTERNAL MEDICINE

## 2019-12-07 PROCEDURE — 36415 COLL VENOUS BLD VENIPUNCTURE: CPT | Performed by: INTERNAL MEDICINE

## 2019-12-07 PROCEDURE — 82306 VITAMIN D 25 HYDROXY: CPT | Performed by: INTERNAL MEDICINE

## 2019-12-08 LAB — DEPRECATED CALCIDIOL+CALCIFEROL SERPL-MC: 24 UG/L (ref 20–75)

## 2019-12-10 NOTE — RESULT ENCOUNTER NOTE
"Please call to offer phone visit to discuss cholesterol medication.  If pt wishes to start a med without a discussion/phone visit, will need a repeat lab-only appt in 3 months and then please route back to me.      Dear Marlee,    The results of your recent lipid (cholesterol) profile were abnormal.  Specifically, your LDL (aka \"bad\" cholesterol) is very high.    For an LDL > 190, the guidelines recommend starting a cholesterol medication in addition to lifestyle modifications to lower your risk of heart disease and stroke.  I recommend we discuss the benefits and risks of starting a cholesterol medication and come up with a personalized plan of care in a dedicated phone or office visit.  I will ask my nurse to call you to assist in scheduling this.    In the mean time, see below for lifestyle modifications to help lower your cholesterol.    Here are some ways to improve your nutrition:  - Eat less fat (especially butter, Crisco and other saturated fats)  - Buy lean cuts of meat, reduce your portions of red meat or substitute poultry or fish  - Eat no more than 4 egg yolks per week  - Avoid fried or fast foods that are high in fat  - Eat more fruits and vegetables  - Reduce the percent of calories from saturated fat and trans fat (to less than 5-10% of total calories consumed)  - Limits intake of sweets, sugar-sweetened beverages, and red meats  - Increase intake of leafy green vegetables, fruits, and whole grains.  - A healthy balanced diet can include low-fat dairy products, poultry, fish, legumes, nontropical vegetable oils, and nuts    Also consider starting or increasing your aerobic activity. Aerobic activity is the best way to improve HDL (good) cholesterol.   - 3 or 4 physical activity sessions/week  - Average duration 40 minutes/session  - Activity should be moderate-to-vigorous intensity (I recommend a mixture of cardiovascular and strength training).    The remainder of your lab results are normal.  " Please feel free to call with further questions.     Sincerely,    Marlena Leonardo MD

## 2019-12-16 ENCOUNTER — MYC MEDICAL ADVICE (OUTPATIENT)
Dept: PEDIATRICS | Facility: CLINIC | Age: 29
End: 2019-12-16

## 2019-12-16 DIAGNOSIS — E78.2 MIXED HYPERLIPIDEMIA: ICD-10-CM

## 2019-12-16 RX ORDER — ATORVASTATIN CALCIUM 40 MG/1
40 TABLET, FILM COATED ORAL DAILY
Qty: 90 TABLET | Refills: 3 | Status: SHIPPED | OUTPATIENT
Start: 2019-12-16 | End: 2020-01-09

## 2019-12-26 DIAGNOSIS — E66.01 MORBID OBESITY (H): ICD-10-CM

## 2019-12-26 NOTE — TELEPHONE ENCOUNTER
We received a refill request from patient's pharmacy[facility] in [place] for a 90-day refill of Bupropion  mg daily.    ML AGUILAR MA on 12/26/2019 at 3:51 PM

## 2019-12-27 RX ORDER — BUPROPION HYDROCHLORIDE 300 MG/1
300 TABLET ORAL EVERY MORNING
Qty: 90 TABLET | Refills: 11 | Status: SHIPPED | OUTPATIENT
Start: 2019-12-27 | End: 2020-12-30

## 2019-12-27 NOTE — TELEPHONE ENCOUNTER
"Requested Prescriptions   Pending Prescriptions Disp Refills     buPROPion (WELLBUTRIN XL) 300 MG 24 hr tablet 90 tablet 11     Sig: Take 1 tablet (300 mg) by mouth every morning   Last Written Prescription Date:  11/29/19  Last Fill Quantity: 90,  # refills: 11   Last office visit: 11/29/2019 with prescribing provider   Future Office Visit:  lisa      SSRIs Protocol Passed - 12/26/2019  3:52 PM        Passed - Recent (12 mo) or future (30 days) visit within the authorizing provider's specialty     Patient has had an office visit with the authorizing provider or a provider within the authorizing providers department within the previous 12 mos or has a future within next 30 days. See \"Patient Info\" tab in inbasket, or \"Choose Columns\" in Meds & Orders section of the refill encounter.              Passed - Medication is Bupropion     If the medication is Bupropion (Wellbutrin), and the patient is taking for smoking cessation; OK to refill.          Passed - Medication is active on med list        Passed - Patient is age 18 or older        Passed - No active pregnancy on record        Passed - No positive pregnancy test in last 12 months        Prescription [place] per [facility] [place].  Ethel Ramirez RN on 12/27/2019 at 11:07 AM    "

## 2020-01-08 NOTE — TELEPHONE ENCOUNTER
Patient experiencing joint/muslce soreness from starting Lipitor 40 mg every day.     Please advise on lowering dose or alternative.

## 2020-01-10 NOTE — TELEPHONE ENCOUNTER
[employer] message sent to patient to see if she would be willing to do a phone visit. Gemini Blank RN on 1/10/2020 at 7:12 AM

## 2020-01-10 NOTE — TELEPHONE ENCOUNTER
Need more info, such as where are her muscle aches?  Joint aches are not typical.  When did her symptoms start in relation to starting statin and did her symptoms improve after stopping?  Is she taking before bedtime?  I think a phone visit would be most ideal, but if she is not able/willing, then please get answers to above questions and then route back.    Thanks.    Marlena Leonardo MD

## 2020-01-17 NOTE — TELEPHONE ENCOUNTER
Phone visit scheduled. Patient has stopped taking Atorvastatin and aching and muscle cramps in her upper and lower extremities have improved. Patient is open to trying another statin, will discuss further at upcoming phone visit.    ML AGUILAR MA on 1/17/2020 at 3:39 PM

## 2020-02-03 ENCOUNTER — VIRTUAL VISIT (OUTPATIENT)
Dept: PEDIATRICS | Facility: CLINIC | Age: 30
End: 2020-02-03
Payer: COMMERCIAL

## 2020-02-03 DIAGNOSIS — E78.2 MIXED HYPERLIPIDEMIA: Primary | ICD-10-CM

## 2020-02-03 PROCEDURE — 99441 ZZC PHYSICIAN TELEPHONE EVALUATION 5-10 MIN: CPT | Performed by: INTERNAL MEDICINE

## 2020-02-03 RX ORDER — ROSUVASTATIN CALCIUM 20 MG/1
20 TABLET, COATED ORAL DAILY
Qty: 30 TABLET | Refills: 3 | Status: SHIPPED | OUTPATIENT
Start: 2020-02-03 | End: 2022-02-28

## 2020-02-03 NOTE — PROGRESS NOTES
"  Marlee Appiah is a 29 year old female who is being evaluated via a billable telephone visit.      The patient has been notified of following:     \"This telephone visit will be conducted via a call between you and your physician/provider. We have found that certain health care needs can be provided without the need for a physical exam.  This service lets us provide the care you need with a short phone conversation.  If a prescription is necessary we can send it directly to your pharmacy.  If lab work is needed we can place an order for that and you can then stop by our lab to have the test done at a later time.    If during the course of the call the physician/provider feels a telephone visit is not appropriate, you will not be charged for this service.\"     Consent has been obtained for this service by 1 care team member: yes. See the scanned image in the medical record.    Marlee Appiah complains of    Chief Complaint   Patient presents with     Lipids     [phone]     Medication Problem       I have reviewed and updated the patient's Past Medical History, Social History, Family History and Medication List.    ALLERGIES  Patient has no known allergies.    ML AGUILAR MA on 2/3/2020 at 6:38 PM      Hyperlipidemia Follow-Up /Discuss starting new statin due to side effects of Atorvastatin       Are you regularly taking any medication or supplement to lower your cholesterol?   No    Are you having muscle aches or other side effects that you think could be caused by your cholesterol lowering medication?  Yes- Atorvastatin was stopped due to significant muscle aches - copay was also high    Stopped taking lipitor after a couple of weeks.  Had bad muscle pains in knees, calves, and shoulder (bilateral).  Onset coincided with starting atorvastatin and stopped when she discontinued.  Is willing to try another medications, however hoping for one that is more affordable (atorvastatin was " expensive).      Assessment/Plan:  Mixed hyperlipidemia  (primary encounter diagnosis)    Plan: switch to simvastatin 20 mg (still high intensity).  Will inform me if develops symptoms again and I will try a third agent (pravastatin likely).  If medication is expensive, will contact her insurance for preferred statin on formulary and will inform me.    Otherwise, will check labs in 3 months.    I have reviewed the note as documented above.  This accurately captures the substance of my conversation with the patient.      Total time of call between patient and provider was 6 minutes     Marlena Leonardo MD

## 2020-08-19 DIAGNOSIS — J45.909 ASTHMA, UNSPECIFIED ASTHMA SEVERITY, UNSPECIFIED WHETHER COMPLICATED, UNSPECIFIED WHETHER PERSISTENT: ICD-10-CM

## 2020-08-20 RX ORDER — ALBUTEROL SULFATE 90 UG/1
2 AEROSOL, METERED RESPIRATORY (INHALATION) EVERY 4 HOURS PRN
Qty: 1 INHALER | Refills: 0 | Status: SHIPPED | OUTPATIENT
Start: 2020-08-20 | End: 2020-09-17

## 2020-08-20 NOTE — TELEPHONE ENCOUNTER
3 month edward refill given. Patient will need office visit for further refills.     MA/TC: Please call patient and complete an ACT and schedule patient for asthma recheck.     Gemini Blank RN on 8/20/2020 at 8:20 AM

## 2020-08-28 NOTE — TELEPHONE ENCOUNTER
[facility] call to assist with scheduling OV for continued refills. No answer, LVM asking to call back on CHG direct extension.    If patient calls back assist with scheduling asthma follow up. And ACT.    Brarie Aiken at 11:30 AM on 8/28/2020  [facility] Health Guide  Phone [phone]

## 2020-09-02 NOTE — TELEPHONE ENCOUNTER
Patient was transferred to [facility] through call room. Patient is calling to schedule asthma follow up, due to conflicting schedules [facility] was unable to schedule with PCP. Patient scheduled with  for 09/17/2020 at 1320.    Barrie Aiken at 11:57 AM on 9/2/2020  [facility] Health Guide  Phone [phone]

## 2020-09-17 ENCOUNTER — VIRTUAL VISIT (OUTPATIENT)
Dept: PEDIATRICS | Facility: CLINIC | Age: 30
End: 2020-09-17
Payer: COMMERCIAL

## 2020-09-17 DIAGNOSIS — J45.909 ASTHMA, UNSPECIFIED ASTHMA SEVERITY, UNSPECIFIED WHETHER COMPLICATED, UNSPECIFIED WHETHER PERSISTENT: ICD-10-CM

## 2020-09-17 PROCEDURE — 99214 OFFICE O/P EST MOD 30 MIN: CPT | Mod: 95 | Performed by: INTERNAL MEDICINE

## 2020-09-17 RX ORDER — ALBUTEROL SULFATE 90 UG/1
2 AEROSOL, METERED RESPIRATORY (INHALATION) EVERY 4 HOURS PRN
Qty: 1 INHALER | Refills: 2 | Status: SHIPPED | OUTPATIENT
Start: 2020-09-17 | End: 2022-01-10

## 2020-09-17 RX ORDER — FLUTICASONE PROPIONATE AND SALMETEROL 113; 14 UG/1; UG/1
1 POWDER, METERED RESPIRATORY (INHALATION) 2 TIMES DAILY
Qty: 3 INHALER | Refills: 1 | Status: SHIPPED | OUTPATIENT
Start: 2020-09-17 | End: 2022-02-28

## 2020-09-17 NOTE — PATIENT INSTRUCTIONS
"It was good talking with you earlier today.  Here's a summary of what we discussed:    1)  We should try you on a preventive inhaler instead of a rescue inhaler.  We'll begin \"Airduo\" twice daily.  (If your insurance does not cover, call them and ask which inhaled corticosteroid they do cover, and we can switch).    2)  I'll also refill the albuterol.      3)  Get a flu shot.    Kp Medellin MD  Internal Medicine and Pediatrics   1  "

## 2020-09-17 NOTE — PROGRESS NOTES
"Marlee Appiah is a 30 year old female who is being evaluated via a billable video visit.      The patient has been notified of following:     \"This video visit will be conducted via a call between you and your physician/provider. We have found that certain health care needs can be provided without the need for an in-person physical exam.  This service lets us provide the care you need with a video conversation.  If a prescription is necessary we can send it directly to your pharmacy.  If lab work is needed we can place an order for that and you can then stop by our lab to have the test done at a later time.    Video visits are billed at different rates depending on your insurance coverage.  Please reach out to your insurance provider with any questions.    If during the course of the call the physician/provider feels a video visit is not appropriate, you will not be charged for this service.\"    Patient has given verbal consent for Video visit? Yes  How would you like to obtain your AVS? MyChart  If you are dropped from the video visit, the video invite should be resent to: Text to cell phone: [phone]  Will anyone else be joining your video visit? No    Subjective     Marlee Appiah is a 30 year old female who presents today via video visit for the following health issues:    History of Present Illness      Asthma:  She presents for follow up of asthma.  She has no cough, no wheezing, and some shortness of breath. She is using a relief medication daily. She does not miss any doses of her controller medication throughout the week.Patient is aware of the following triggers: same as previous visit, animal dander, dust mites, exercise or sports and pollens. The patient has not had a visit to the Emergency Room, Urgent Care or Hospital due to asthma since the last clinic visit.     She eats 4 or more servings of fruits and vegetables daily.She consumes 1 sweetened beverage(s) daily.She exercises with enough effort " to increase her heart rate 30 to 60 minutes per day.  She exercises with enough effort to increase her heart rate 5 days per week.   She is taking medications regularly.     ACT Total Scores 9/17/2019 11/29/2019 9/17/2020   ACT TOTAL SCORE (Goal Greater than or Equal to 20) 22 23 18   In the past 12 months, how many times did you visit the emergency room for your asthma without being admitted to the hospital? 0 0 0   In the past 12 months, how many times were you hospitalized overnight because of your asthma? 0 0 0     Video Start Time: 1:25 PM    New Patient/Transfer of Care    Needs an asthma follow up.  Needs an appointment to get another refills.   Uses her inhaler most days for exercise.   If does not exercise, does not use inhaler much.   Does gt allergy induced symptoms if near dust or cats.     Review of Systems   CONSTITUTIONAL: NEGATIVE for fever, chills, change in weight  INTEGUMENTARY/SKIN: NEGATIVE for worrisome rashes, moles or lesions  EYES: NEGATIVE for vision changes or irritation  ENT/MOUTH: NEGATIVE for ear, mouth and throat problems  RESP: NEGATIVE for significant cough or SOB  BREAST: NEGATIVE for masses, tenderness or discharge  CV: NEGATIVE for chest pain, palpitations or peripheral edema  GI: NEGATIVE for nausea, abdominal pain, heartburn, or change in bowel habits  : NEGATIVE for frequency, dysuria, or hematuria  MUSCULOSKELETAL: NEGATIVE for significant arthralgias or myalgia  NEURO: NEGATIVE for weakness, dizziness or paresthesias  ENDOCRINE: NEGATIVE for temperature intolerance, skin/hair changes  HEME: NEGATIVE for bleeding problems  PSYCHIATRIC: NEGATIVE for changes in mood or affect      Objective           Vitals:  No vitals were obtained today due to virtual visit.    Physical Exam     GENERAL: Healthy, alert and no distress  EYES: Eyes grossly normal to inspection.  No discharge or erythema, or obvious scleral/conjunctival abnormalities.  RESP: No audible wheeze, cough, or visible  "cyanosis.  No visible retractions or increased work of breathing.    SKIN: Visible skin clear. No significant rash, abnormal pigmentation or lesions.  NEURO: Cranial nerves grossly intact.  Mentation and speech appropriate for age.  PSYCH: Mentation appears normal, affect normal/bright, judgement and insight intact, normal speech and appearance well-groomed.              Assessment & Plan     Asthma, unspecified asthma severity, unspecified whether complicated, unspecified whether persistent  Poor control, using albuterol daily, mainly for exercise and cats and dust.  Recommend trial of airduo and repeat Asthma Control Test in 1 month.  Postponed team message to repeat her Asthma Control Test.    - fluticasone-salmeterol (AIRDUO RESPICLICK) 113-14 MCG/ACT inhaler; Inhale 1 puff into the lungs 2 times daily  - albuterol (PROAIR HFA/PROVENTIL HFA/VENTOLIN HFA) 108 (90 Base) MCG/ACT inhaler; Inhale 2 puffs into the lungs every 4 hours as needed for shortness of breath / dyspnea or wheezing     BMI:   Estimated body mass index is 47.83 kg/m  as calculated from the following:    Height as of 11/29/19: 1.68 m (5' 6.14\").    Weight as of 11/29/19: 135 kg (297 lb 9.6 oz).   Weight management plan: Patient was referred to their PCP to discuss a diet and exercise plan.        Patient Instructions   It was good talking with you earlier today.  Here's a summary of what we discussed:    1)  We should try you on a preventive inhaler instead of a rescue inhaler.  We'll begin \"Airduo\" twice daily.  (If your insurance does not cover, call them and ask which inhaled corticosteroid they do cover, and we can switch).    2)  I'll also refill the albuterol.      3)  Get a flu shot.    Kp Medellin MD  Internal Medicine and Pediatrics   1      Return in about 4 weeks (around 10/15/2020) for Followup of today's problem.    Kp Medellin MD  [facility]      Video-Visit Details    Type of service:  Video Visit    Video End " Time:1:34 PM    Originating Location (pt. Location): Home    Distant Location (provider location):  [facility] [place]     Platform used for Video Visit: Steve

## 2020-09-18 ASSESSMENT — ASTHMA QUESTIONNAIRES: ACT_TOTALSCORE: 18

## 2020-10-06 ENCOUNTER — MYC MEDICAL ADVICE (OUTPATIENT)
Dept: PEDIATRICS | Facility: CLINIC | Age: 30
End: 2020-10-06

## 2020-10-08 NOTE — TELEPHONE ENCOUNTER
Patient sent [email or web address] message saying they were unable to afford their albuterol inhaler. [facility] call for follow up on mychart message and is informed their insurance won't cover it until they have reached their deductible, and the medication is fluticasone-salmeterol (AIRDUO RESPICLICK). CHG is attempting to find resources to help with cost.    Barrie Aiken at 3:28 PM on 10/8/2020  [facility] Health Guide  Phone [phone]

## 2020-10-16 NOTE — TELEPHONE ENCOUNTER
CHG called and informed patient that CHG had trouble finding programs to help pay for asthma medications, CHG provided patient with number for [place] Pres[facility] Assistance Program ([phone])    Patient provided with CHG direct extension and asked to call if they have any questions or need any help.    Barrie Aiken at 3:20 PM on 10/16/2020  [facility] Health Guide  Phone [phone]

## 2020-10-22 ENCOUNTER — TELEPHONE (OUTPATIENT)
Dept: PEDIATRICS | Facility: CLINIC | Age: 30
End: 2020-10-22

## 2020-10-22 NOTE — TELEPHONE ENCOUNTER
Has been working with [employer] Eric Aiken in regards to figuring out cost of inhaler.     Patient looked up cost of prescription if she were to use [facility] and patient feels with that she would be able to afford the inhaler. I reviewed with patient the way good [facility] works and that sometimes going to another pharmacy may be cheaper. Also reviewed how she can transfer an rx from one pharmacy to another.    No further followup is needed at this time. Patient will call back if she needs assistance.    ML AGUILAR MA on 10/22/2020 at 2:45 PM

## 2020-10-22 NOTE — TELEPHONE ENCOUNTER
OK. . . .     Not sure what she'd like from us, then.    Did she call her insurance and ask which inhaled corticosteroid with long acting beta agonist WOULD be covered, then?    She should.  THen we can change to that.    Kp Medellin MD  Internal Medicine and Pediatrics

## 2020-10-22 NOTE — TELEPHONE ENCOUNTER
"Per Dr. Medellin:    \"Please call and perform ACT over the phone.  They should have been given a paper Asthma Control Test at their last visit.     If ACT score is below 20, please recommend that patient schedule a visit (If refuses an office visit, may schedule a phone visit.)     Kp Medellin MD   Internal Medicine and Pediatrics\"    ACT score was 17 today.     Patient stated that she was unable to get new inhaler that was prescribed on 09/17/2020 due to cost. Patient also declined scheduling f/u visit virtual or in clinic due to cost.      ML AGUILAR MA on 10/22/2020 at 9:52 AM        "

## 2020-10-23 ASSESSMENT — ASTHMA QUESTIONNAIRES: ACT_TOTALSCORE: 17

## 2020-12-29 DIAGNOSIS — E66.01 MORBID OBESITY (H): ICD-10-CM

## 2020-12-30 RX ORDER — BUPROPION HYDROCHLORIDE 300 MG/1
300 TABLET ORAL EVERY MORNING
Qty: 90 TABLET | Refills: 0 | Status: SHIPPED | OUTPATIENT
Start: 2020-12-30 | End: 2021-04-26

## 2020-12-30 NOTE — TELEPHONE ENCOUNTER
Routing to MA/TC Team to help patient set up an annual physical at her convenience.     Prescription approved per [facility] Refill Protocol.

## 2021-01-15 ENCOUNTER — HEALTH MAINTENANCE LETTER (OUTPATIENT)
Age: 31
End: 2021-01-15

## 2021-01-25 ENCOUNTER — AMBULATORY - HEALTHEAST (OUTPATIENT)
Dept: NURSING | Facility: CLINIC | Age: 31
End: 2021-01-25

## 2021-01-28 DIAGNOSIS — Z30.41 ENCOUNTER FOR SURVEILLANCE OF CONTRACEPTIVE PILLS: ICD-10-CM

## 2021-01-29 RX ORDER — LEVONORGESTREL AND ETHINYL ESTRADIOL 0.15-0.03
1 KIT ORAL DAILY
Qty: 84 TABLET | Refills: 0 | Status: SHIPPED | OUTPATIENT
Start: 2021-01-29 | End: 2021-04-20

## 2021-01-29 NOTE — TELEPHONE ENCOUNTER
Prescription approved per [facility] Refill Protocol.    Jessica Powell RN

## 2021-02-16 ENCOUNTER — AMBULATORY - HEALTHEAST (OUTPATIENT)
Dept: NURSING | Facility: CLINIC | Age: 31
End: 2021-02-16

## 2021-04-24 DIAGNOSIS — E66.01 MORBID OBESITY (H): ICD-10-CM

## 2021-04-26 RX ORDER — BUPROPION HYDROCHLORIDE 300 MG/1
300 TABLET ORAL EVERY MORNING
Qty: 90 TABLET | Refills: 2 | Status: SHIPPED | OUTPATIENT
Start: 2021-04-26 | End: 2022-01-17

## 2021-04-26 NOTE — TELEPHONE ENCOUNTER
Prescription approved per [facility] Refill Protocol.    Carlos A Nguyen RN

## 2021-05-05 ENCOUNTER — TELEPHONE (OUTPATIENT)
Dept: PEDIATRICS | Facility: CLINIC | Age: 31
End: 2021-05-05

## 2021-05-05 ENCOUNTER — MYC MEDICAL ADVICE (OUTPATIENT)
Dept: PEDIATRICS | Facility: CLINIC | Age: 31
End: 2021-05-05

## 2021-05-05 DIAGNOSIS — J45.21 MILD INTERMITTENT ASTHMA WITH EXACERBATION: Primary | ICD-10-CM

## 2021-05-05 NOTE — LETTER
My Asthma Action Plan    Name: Marlee Appiah   YOB: 1990  Date: 5/5/2021   My doctor: Marlena Leonardo MD   My clinic: [facility]        My Control Medicine: Fluticasone propionate + salmeterol (AirDuo RespiClick) -  113/14 mcg 1 puff twice daily  My Rescue Medicine: Albuterol (Proair/Ventolin/Proventil HFA) 2-4 puffs EVERY 4 HOURS as needed. Use a spacer if recommended by your provider.   My Asthma Severity:   Mild Persistent  Know your asthma triggers:   animal dander  exercise or sports            GREEN ZONE   Good Control    I feel good    No cough or wheeze    Can work, sleep and play without asthma symptoms       Take your asthma control medicine every day.     1. If exercise triggers your asthma, take your rescue medication    15 minutes before exercise or sports, and    During exercise if you have asthma symptoms  2. Spacer to use with inhaler: If you have a spacer, make sure to use it with your inhaler             YELLOW ZONE Getting Worse  I have ANY of these:    I do not feel good    Cough or wheeze    Chest feels tight    Wake up at night   1. Keep taking your Green Zone medications  2. Start taking your rescue medicine:    every 20 minutes for up to 1 hour. Then every 4 hours for 24-48 hours.  3. If you stay in the Yellow Zone for more than 12-24 hours, contact your doctor.  4. If you do not return to the Green Zone in 12-24 hours or you get worse, start taking your oral steroid medicine if prescribed by your provider.           RED ZONE Medical Alert - Get Help  I have ANY of these:    I feel awful    Medicine is not helping    Breathing getting harder    Trouble walking or talking    Nose opens wide to breathe       1. Take your rescue medicine NOW  2. If your provider has prescribed an oral steroid medicine, start taking it NOW  3. Call your doctor NOW  4. If you are still in the Red Zone after 20 minutes and you have not reached your doctor:    Take your  rescue medicine again and    Call 911 or go to the emergency room right away    See your regular doctor within 2 weeks of an Emergency Room or Urgent Care visit for follow-up treatment.          Annual Reminders:  Meet with Asthma Educator,  Flu Shot in the Fall, consider Pneumonia Vaccination for patients with asthma (aged 19 and older).    Pharmacy: [id number] - [place], [place] - [address] AT [facility] [address]    Electronically signed by Marlena Leonardo MD   Date: 05/05/21                      Asthma Triggers  How To Control Things That Make Your Asthma Worse    Triggers are things that make your asthma worse.  Look at the list below to help you find your triggers and what you can do about them.  You can help prevent asthma flare-ups by staying away from your triggers.      Trigger                                                          What you can do   Cigarette Smoke  Tobacco smoke can make asthma worse. Do not allow smoking in your home, car or around you.  Be sure no one smokes at a child s day care or school.  If you smoke, ask your health care provider for ways to help you quit.  Ask family members to quit too.  Ask your health care provider for a referral to Quit Plan to help you quit smoking, or call 8-984-082-PLAN.     Colds, Flu, Bronchitis  These are common triggers of asthma. Wash your hands often.  Don t touch your eyes, nose or mouth.  Get a flu shot every year.     Dust Mites  These are tiny bugs that live in cloth or carpet. They are too small to see. Wash sheets and blankets in hot water every week.   Encase pillows and mattress in dust mite proof covers.  Avoid having carpet if you can. If you have carpet, vacuum weekly.   Use a dust mask and HEPA vacuum.   Pollen and Outdoor Mold  Some people are allergic to trees, grass, or weed pollen, or molds. Try to keep your windows closed.  Limit time out doors when pollen count is high.   Ask you health care provider  about taking medicine during allergy season.     Animal Dander  Some people are allergic to skin flakes, urine or saliva from pets with fur or feathers. Keep pets with fur or feathers out of your home.    If you can t keep the pet outdoors, then keep the pet out of your bedroom.  Keep the bedroom door closed.  Keep pets off cloth furniture and away from stuffed toys.     Mice, Rats, and Cockroaches   Some people are allergic to the waste from these pests.   Cover food and garbage.  Clean up spills and food crumbs.  Store grease in the refrigerator.   Keep food out of the bedroom.   Indoor Mold  This can be a trigger if your home has high moisture. Fix leaking faucets, pipes, or other sources of water.   Clean moldy surfaces.  Dehumidify basement if it is damp and smelly.   Smoke, Strong Odors, and Sprays  These can reduce air quality. Stay away from strong odors and sprays, such as perfume, powder, hair spray, paints, smoke incense, paint, cleaning products, candles and new carpet.   Exercise or Sports  Some people with asthma have this trigger. Be active!  Ask your doctor about taking medicine before sports or exercise to prevent symptoms.    Warm up for 5-10 minutes before and after sports or exercise.     Other Triggers of Asthma  Cold air:  Cover your nose and mouth with a scarf.  Sometimes laughing or crying can be a trigger.  Some medicines and food can trigger asthma.

## 2021-05-05 NOTE — TELEPHONE ENCOUNTER
Patient Quality Outreach      Summary:    Patient has the following on her problem list/HM:     Asthma review       ACT Total Scores 10/22/2020   ACT TOTAL SCORE (Goal Greater than or Equal to 20) 17   In the past 12 months, how many times did you visit the emergency room for your asthma without being admitted to the hospital? 0   In the past 12 months, how many times were you hospitalized overnight because of your asthma? 0          Patient is due/failing the following:   ACT needed, Asthma follow-up visit and AAP and Adult/Adolescent physical, date due: 11/2020    Type of outreach:    Sent [employer] message.    Questions for provider review:    None                                                                                                                                     ML AGUILAR MA on 5/5/2021 at 4:16 PM

## 2021-05-06 NOTE — TELEPHONE ENCOUNTER
Patient replied via [employer] with responses.    ACT Total Scores 11/29/2019 9/17/2020 10/22/2020   ACT TOTAL SCORE (Goal Greater than or Equal to 20) 23 18 17   In the past 12 months, how many times did you visit the emergency room for your asthma without being admitted to the hospital? 0 0 0   In the past 12 months, how many times were you hospitalized overnight because of your asthma? 0 0 0     ML AGUILAR MA on 5/6/2021 at 7:34 AM

## 2021-05-07 ASSESSMENT — ASTHMA QUESTIONNAIRES: ACT_TOTALSCORE: 22

## 2021-07-16 NOTE — TELEPHONE ENCOUNTER
I spoke to patient; appointment scheduled in November.     ACT obtained.    ACT Total Scores 9/17/2019   ACT TOTAL SCORE (Goal Greater than or Equal to 20) 22   In the past 12 months, how many times did you visit the emergency room for your asthma without being admitted to the hospital? 0   In the past 12 months, how many times were you hospitalized overnight because of your asthma? 0       ML AGUILAR MA on 9/17/2019 at 4:49 PM     normal...

## 2021-10-02 ENCOUNTER — HEALTH MAINTENANCE LETTER (OUTPATIENT)
Age: 31
End: 2021-10-02

## 2021-11-01 ENCOUNTER — MYC MEDICAL ADVICE (OUTPATIENT)
Dept: PEDIATRICS | Facility: CLINIC | Age: 31
End: 2021-11-01

## 2021-11-01 DIAGNOSIS — Z30.41 ENCOUNTER FOR SURVEILLANCE OF CONTRACEPTIVE PILLS: ICD-10-CM

## 2021-11-01 RX ORDER — LEVONORGESTREL AND ETHINYL ESTRADIOL 0.15-0.03
1 KIT ORAL DAILY
Qty: 84 TABLET | Refills: 0 | Status: SHIPPED | OUTPATIENT
Start: 2021-11-01 | End: 2022-01-20

## 2021-11-01 NOTE — TELEPHONE ENCOUNTER
Prescription approved per [facility] Refill Protocol.  ROGELIO refill. Needs appointment.  Notified patient via [employer].  Linda Diallo RN

## 2022-01-06 DIAGNOSIS — J45.909 ASTHMA, UNSPECIFIED ASTHMA SEVERITY, UNSPECIFIED WHETHER COMPLICATED, UNSPECIFIED WHETHER PERSISTENT: ICD-10-CM

## 2022-01-10 RX ORDER — ALBUTEROL SULFATE 90 UG/1
AEROSOL, METERED RESPIRATORY (INHALATION)
Qty: 8.5 G | Refills: 0 | Status: SHIPPED | OUTPATIENT
Start: 2022-01-10 | End: 2022-02-28

## 2022-01-10 NOTE — TELEPHONE ENCOUNTER
3 month edward refill approved. Further refills will be addressed at scheduled visit on 2/28/22. Gemini Blank RN on 1/10/2022 at 11:40 AM

## 2022-01-15 DIAGNOSIS — E66.01 MORBID OBESITY (H): ICD-10-CM

## 2022-01-17 RX ORDER — BUPROPION HYDROCHLORIDE 300 MG/1
300 TABLET ORAL EVERY MORNING
Qty: 90 TABLET | Refills: 0 | Status: SHIPPED | OUTPATIENT
Start: 2022-01-17 | End: 2022-02-28

## 2022-01-17 NOTE — TELEPHONE ENCOUNTER
Medication is being filled for 1 time refill only due to:  Patient needs to be seen because it has been more than one year since last visit.     Has visit scheduled. RN will issue 90 day supply.     Jesenia Smith RN   [facility]  -- Triage Nurse

## 2022-01-19 DIAGNOSIS — Z30.41 ENCOUNTER FOR SURVEILLANCE OF CONTRACEPTIVE PILLS: ICD-10-CM

## 2022-01-20 RX ORDER — LEVONORGESTREL AND ETHINYL ESTRADIOL 0.15-0.03
1 KIT ORAL DAILY
Qty: 84 TABLET | Refills: 0 | Status: SHIPPED | OUTPATIENT
Start: 2022-01-20 | End: 2022-02-28

## 2022-01-20 NOTE — TELEPHONE ENCOUNTER
Patient has upcoming appointment 2/28/22. Prescription approved per [facility] Refill Protocol.  Burton MELVIN RN

## 2022-01-22 ENCOUNTER — HEALTH MAINTENANCE LETTER (OUTPATIENT)
Age: 32
End: 2022-01-22

## 2022-02-28 ENCOUNTER — OFFICE VISIT (OUTPATIENT)
Dept: PEDIATRICS | Facility: CLINIC | Age: 32
End: 2022-02-28
Payer: COMMERCIAL

## 2022-02-28 VITALS
HEART RATE: 80 BPM | OXYGEN SATURATION: 97 % | RESPIRATION RATE: 18 BRPM | TEMPERATURE: 98 F | HEIGHT: 66 IN | SYSTOLIC BLOOD PRESSURE: 128 MMHG | BODY MASS INDEX: 41.46 KG/M2 | DIASTOLIC BLOOD PRESSURE: 81 MMHG | WEIGHT: 258 LBS

## 2022-02-28 DIAGNOSIS — Z00.00 ROUTINE GENERAL MEDICAL EXAMINATION AT A HEALTH CARE FACILITY: Primary | ICD-10-CM

## 2022-02-28 DIAGNOSIS — E66.01 MORBID OBESITY (H): ICD-10-CM

## 2022-02-28 DIAGNOSIS — Z30.41 ENCOUNTER FOR SURVEILLANCE OF CONTRACEPTIVE PILLS: ICD-10-CM

## 2022-02-28 DIAGNOSIS — Z11.3 SCREEN FOR STD (SEXUALLY TRANSMITTED DISEASE): ICD-10-CM

## 2022-02-28 DIAGNOSIS — J45.909 ASTHMA, UNSPECIFIED ASTHMA SEVERITY, UNSPECIFIED WHETHER COMPLICATED, UNSPECIFIED WHETHER PERSISTENT: ICD-10-CM

## 2022-02-28 DIAGNOSIS — Z11.59 NEED FOR HEPATITIS C SCREENING TEST: ICD-10-CM

## 2022-02-28 DIAGNOSIS — F41.1 GAD (GENERALIZED ANXIETY DISORDER): ICD-10-CM

## 2022-02-28 DIAGNOSIS — E78.2 MIXED HYPERLIPIDEMIA: ICD-10-CM

## 2022-02-28 DIAGNOSIS — Z12.4 CERVICAL CANCER SCREENING: ICD-10-CM

## 2022-02-28 LAB
ALBUMIN SERPL-MCNC: 3.2 G/DL (ref 3.4–5)
ALP SERPL-CCNC: 41 U/L (ref 40–150)
ALT SERPL W P-5'-P-CCNC: 27 U/L (ref 0–50)
ANION GAP SERPL CALCULATED.3IONS-SCNC: 11 MMOL/L (ref 3–14)
AST SERPL W P-5'-P-CCNC: 10 U/L (ref 0–45)
BILIRUB SERPL-MCNC: 0.3 MG/DL (ref 0.2–1.3)
BUN SERPL-MCNC: 10 MG/DL (ref 7–30)
CALCIUM SERPL-MCNC: 9 MG/DL (ref 8.5–10.1)
CHLORIDE BLD-SCNC: 109 MMOL/L (ref 94–109)
CHOLEST SERPL-MCNC: 194 MG/DL
CO2 SERPL-SCNC: 20 MMOL/L (ref 20–32)
CREAT SERPL-MCNC: 0.66 MG/DL (ref 0.52–1.04)
FASTING STATUS PATIENT QL REPORTED: YES
GFR SERPL CREATININE-BSD FRML MDRD: >90 ML/MIN/1.73M2
GLUCOSE BLD-MCNC: 79 MG/DL (ref 70–99)
HDLC SERPL-MCNC: 46 MG/DL
HIV 1+2 AB+HIV1 P24 AG SERPL QL IA: NONREACTIVE
LDLC SERPL CALC-MCNC: 124 MG/DL
NONHDLC SERPL-MCNC: 148 MG/DL
POTASSIUM BLD-SCNC: 3.9 MMOL/L (ref 3.4–5.3)
PROT SERPL-MCNC: 6.7 G/DL (ref 6.8–8.8)
SODIUM SERPL-SCNC: 140 MMOL/L (ref 133–144)
T PALLIDUM AB SER QL: NONREACTIVE
TRIGL SERPL-MCNC: 119 MG/DL

## 2022-02-28 PROCEDURE — 80053 COMPREHEN METABOLIC PANEL: CPT | Performed by: INTERNAL MEDICINE

## 2022-02-28 PROCEDURE — 87591 N.GONORRHOEAE DNA AMP PROB: CPT | Performed by: INTERNAL MEDICINE

## 2022-02-28 PROCEDURE — 87389 HIV-1 AG W/HIV-1&-2 AB AG IA: CPT | Performed by: INTERNAL MEDICINE

## 2022-02-28 PROCEDURE — 99214 OFFICE O/P EST MOD 30 MIN: CPT | Mod: 25 | Performed by: INTERNAL MEDICINE

## 2022-02-28 PROCEDURE — G0145 SCR C/V CYTO,THINLAYER,RESCR: HCPCS | Performed by: INTERNAL MEDICINE

## 2022-02-28 PROCEDURE — 36415 COLL VENOUS BLD VENIPUNCTURE: CPT | Performed by: INTERNAL MEDICINE

## 2022-02-28 PROCEDURE — 86780 TREPONEMA PALLIDUM: CPT | Performed by: INTERNAL MEDICINE

## 2022-02-28 PROCEDURE — 87624 HPV HI-RISK TYP POOLED RSLT: CPT | Performed by: INTERNAL MEDICINE

## 2022-02-28 PROCEDURE — 80061 LIPID PANEL: CPT | Performed by: INTERNAL MEDICINE

## 2022-02-28 PROCEDURE — 99395 PREV VISIT EST AGE 18-39: CPT | Performed by: INTERNAL MEDICINE

## 2022-02-28 PROCEDURE — 87491 CHLMYD TRACH DNA AMP PROBE: CPT | Performed by: INTERNAL MEDICINE

## 2022-02-28 RX ORDER — FLUOXETINE 10 MG/1
CAPSULE ORAL
Qty: 49 CAPSULE | Refills: 1 | Status: SHIPPED | OUTPATIENT
Start: 2022-02-28 | End: 2022-04-22

## 2022-02-28 RX ORDER — LEVONORGESTREL AND ETHINYL ESTRADIOL 0.15-0.03
1 KIT ORAL DAILY
Qty: 84 TABLET | Refills: 4 | Status: SHIPPED | OUTPATIENT
Start: 2022-02-28 | End: 2023-03-29

## 2022-02-28 RX ORDER — BUPROPION HYDROCHLORIDE 300 MG/1
300 TABLET ORAL EVERY MORNING
Qty: 90 TABLET | Refills: 3 | Status: SHIPPED | OUTPATIENT
Start: 2022-02-28 | End: 2023-03-29

## 2022-02-28 RX ORDER — ROSUVASTATIN CALCIUM 20 MG/1
20 TABLET, COATED ORAL DAILY
Qty: 90 TABLET | Refills: 3 | Status: SHIPPED | OUTPATIENT
Start: 2022-02-28 | End: 2022-04-22

## 2022-02-28 RX ORDER — ALBUTEROL SULFATE 90 UG/1
AEROSOL, METERED RESPIRATORY (INHALATION)
Qty: 8.5 G | Refills: 0 | Status: SHIPPED | OUTPATIENT
Start: 2022-02-28 | End: 2023-04-27

## 2022-02-28 SDOH — ECONOMIC STABILITY: INCOME INSECURITY: HOW HARD IS IT FOR YOU TO PAY FOR THE VERY BASICS LIKE FOOD, HOUSING, MEDICAL CARE, AND HEATING?: NOT HARD AT ALL

## 2022-02-28 SDOH — HEALTH STABILITY: PHYSICAL HEALTH: ON AVERAGE, HOW MANY DAYS PER WEEK DO YOU ENGAGE IN MODERATE TO STRENUOUS EXERCISE (LIKE A BRISK WALK)?: 2 DAYS

## 2022-02-28 SDOH — ECONOMIC STABILITY: INCOME INSECURITY: IN THE LAST 12 MONTHS, WAS THERE A TIME WHEN YOU WERE NOT ABLE TO PAY THE MORTGAGE OR RENT ON TIME?: NO

## 2022-02-28 SDOH — ECONOMIC STABILITY: TRANSPORTATION INSECURITY
IN THE PAST 12 MONTHS, HAS THE LACK OF TRANSPORTATION KEPT YOU FROM MEDICAL APPOINTMENTS OR FROM GETTING MEDICATIONS?: NO

## 2022-02-28 SDOH — ECONOMIC STABILITY: FOOD INSECURITY: WITHIN THE PAST 12 MONTHS, THE FOOD YOU BOUGHT JUST DIDN'T LAST AND YOU DIDN'T HAVE MONEY TO GET MORE.: NEVER TRUE

## 2022-02-28 SDOH — ECONOMIC STABILITY: FOOD INSECURITY: WITHIN THE PAST 12 MONTHS, YOU WORRIED THAT YOUR FOOD WOULD RUN OUT BEFORE YOU GOT MONEY TO BUY MORE.: NEVER TRUE

## 2022-02-28 SDOH — HEALTH STABILITY: PHYSICAL HEALTH: ON AVERAGE, HOW MANY MINUTES DO YOU ENGAGE IN EXERCISE AT THIS LEVEL?: 30 MIN

## 2022-02-28 SDOH — ECONOMIC STABILITY: TRANSPORTATION INSECURITY
IN THE PAST 12 MONTHS, HAS LACK OF TRANSPORTATION KEPT YOU FROM MEETINGS, WORK, OR FROM GETTING THINGS NEEDED FOR DAILY LIVING?: NO

## 2022-02-28 ASSESSMENT — ENCOUNTER SYMPTOMS
JOINT SWELLING: 0
ARTHRALGIAS: 0
SORE THROAT: 0
DIZZINESS: 0
DIARRHEA: 0
COUGH: 0
CONSTIPATION: 0
FREQUENCY: 0
HEADACHES: 1
WEAKNESS: 0
CHILLS: 0
NAUSEA: 0
HEMATURIA: 0
ABDOMINAL PAIN: 0
NERVOUS/ANXIOUS: 1
DYSURIA: 0
SHORTNESS OF BREATH: 0
PARESTHESIAS: 0
HEMATOCHEZIA: 0
MYALGIAS: 0
HEARTBURN: 0
EYE PAIN: 0
PALPITATIONS: 0
FEVER: 0

## 2022-02-28 ASSESSMENT — ANXIETY QUESTIONNAIRES
2. NOT BEING ABLE TO STOP OR CONTROL WORRYING: NEARLY EVERY DAY
1. FEELING NERVOUS, ANXIOUS, OR ON EDGE: MORE THAN HALF THE DAYS
7. FEELING AFRAID AS IF SOMETHING AWFUL MIGHT HAPPEN: MORE THAN HALF THE DAYS
5. BEING SO RESTLESS THAT IT IS HARD TO SIT STILL: SEVERAL DAYS
GAD7 TOTAL SCORE: 14
IF YOU CHECKED OFF ANY PROBLEMS ON THIS QUESTIONNAIRE, HOW DIFFICULT HAVE THESE PROBLEMS MADE IT FOR YOU TO DO YOUR WORK, TAKE CARE OF THINGS AT HOME, OR GET ALONG WITH OTHER PEOPLE: SOMEWHAT DIFFICULT
6. BECOMING EASILY ANNOYED OR IRRITABLE: MORE THAN HALF THE DAYS
3. WORRYING TOO MUCH ABOUT DIFFERENT THINGS: NEARLY EVERY DAY

## 2022-02-28 ASSESSMENT — SOCIAL DETERMINANTS OF HEALTH (SDOH)
ARE YOU MARRIED, WIDOWED, DIVORCED, SEPARATED, NEVER MARRIED, OR LIVING WITH A PARTNER?: NEVER MARRIED
HOW OFTEN DO YOU ATTEND CHURCH OR RELIGIOUS SERVICES?: MORE THAN 4 TIMES PER YEAR
IN A TYPICAL WEEK, HOW MANY TIMES DO YOU TALK ON THE PHONE WITH FAMILY, FRIENDS, OR NEIGHBORS?: MORE THAN THREE TIMES A WEEK
DO YOU BELONG TO ANY CLUBS OR ORGANIZATIONS SUCH AS CHURCH GROUPS UNIONS, FRATERNAL OR ATHLETIC GROUPS, OR SCHOOL GROUPS?: NO
HOW OFTEN DO YOU GET TOGETHER WITH FRIENDS OR RELATIVES?: THREE TIMES A WEEK

## 2022-02-28 ASSESSMENT — LIFESTYLE VARIABLES
HOW OFTEN DO YOU HAVE SIX OR MORE DRINKS ON ONE OCCASION: NEVER
HOW OFTEN DO YOU HAVE A DRINK CONTAINING ALCOHOL: 2-4 TIMES A MONTH
HOW MANY STANDARD DRINKS CONTAINING ALCOHOL DO YOU HAVE ON A TYPICAL DAY: 1 OR 2

## 2022-02-28 ASSESSMENT — PATIENT HEALTH QUESTIONNAIRE - PHQ9
5. POOR APPETITE OR OVEREATING: SEVERAL DAYS
SUM OF ALL RESPONSES TO PHQ QUESTIONS 1-9: 3

## 2022-02-28 ASSESSMENT — ASTHMA QUESTIONNAIRES: ACT_TOTALSCORE: 23

## 2022-02-28 NOTE — PATIENT INSTRUCTIONS
Instructions for antidepressant initiation:    Begin medications and increase dose per instructions (see below).  Monitor for side effects and increase dose only as tolerated.    If all is well, follow-up in clinic in 4-8 weeks.  Make a sooner appointment if symptoms worsen or if side effects are intolerable.    Preventive Health Recommendations  Female Ages 26 - 39  Yearly exam:   See your health care provider every year in order to    Review health changes.     Discuss preventive care.      Review your medicines if you your doctor has prescribed any.    Until age 30: Get a Pap test every three years (more often if you have had an abnormal result).    After age 30: Talk to your doctor about whether you should have a Pap test every 3 years or have a Pap test with HPV screening every 5 years.   You do not need a Pap test if your uterus was removed (hysterectomy) and you have not had cancer.  You should be tested each year for STDs (sexually transmitted diseases), if you're at risk.   Talk to your provider about how often to have your cholesterol checked.  If you are at risk for diabetes, you should have a diabetes test (fasting glucose).  Shots: Get a flu shot each year. Get a tetanus shot every 10 years.   Nutrition:     Eat at least 5 servings of fruits and vegetables each day.    Eat whole-grain bread, whole-wheat pasta and brown rice instead of white grains and rice.    Get adequate Calcium and Vitamin D.     Lifestyle    Exercise at least 150 minutes a week (30 minutes a day, 5 days of the week). This will help you control your weight and prevent disease.    Limit alcohol to one drink per day.    No smoking.     Wear sunscreen to prevent skin cancer.    See your dentist every six months for an exam and cleaning.

## 2022-02-28 NOTE — PROGRESS NOTES
SUBJECTIVE:   CC: Marlee Appiah is an 31 year old woman who presents for preventive health visit.     Patient has been advised of split billing requirements and indicates understanding: Yes  Healthy Habits:     Getting at least 3 servings of Calcium per day:  Yes    Bi-annual eye exam:  Yes    Dental care twice a year:  Yes    Sleep apnea or symptoms of sleep apnea:  Excessive snoring    Diet:  Regular (no restrictions)    Frequency of exercise:  2-3 days/week    Duration of exercise:  30-45 minutes    Taking medications regularly:  Yes    Medication side effects:  None    PHQ-2 Total Score: 1    Additional concerns today:  No    Started statin due to LDL > 190.  Had some side effects and stopped on her own.        ACT Total Scores 9/17/2020 10/22/2020 5/6/2021   ACT TOTAL SCORE (Goal Greater than or Equal to 20) 18 17 22   In the past 12 months, how many times did you visit the emergency room for your asthma without being admitted to the hospital? 0 0 0   In the past 12 months, how many times were you hospitalized overnight because of your asthma? 0 0 0             Today's PHQ-2 Score:   PHQ-2 ( 1999 [employer]) 2/28/2022   Q1: Little interest or pleasure in doing things 0   Q2: Feeling down, depressed or hopeless 1   PHQ-2 Score 1   PHQ-2 Total Score (12-17 Years)- Positive if 3 or more points; Administer PHQ-A if positive -   Q1: Little interest or pleasure in doing things Not at all   Q2: Feeling down, depressed or hopeless Several days   PHQ-2 Score 1       Abuse: Current or Past (Physical, Sexual or Emotional) - No  Do you feel safe in your environment? Yes    Have you ever done Advance Care Planning? (For example, a Health Directive, POLST, or a discussion with a medical provider or your loved ones about your wishes): No, advance care planning information given to patient to review.  Patient declined advance care planning discussion at this time.    Social History     Tobacco Use     Smoking status: Never  "Smoker     Smokeless tobacco: Never Used   Substance Use Topics     Alcohol use: No     Comment: rarely       Alcohol Use 2/28/2022   Prescreen: >3 drinks/day or >7 drinks/week? No   Prescreen: >3 drinks/day or >7 drinks/week? -       Reviewed orders with patient.  Reviewed health maintenance and updated orders accordingly - Yes  Lab work is in process    Breast Cancer Screening:  Any new diagnosis of family breast, ovarian, or bowel cancer? No    FHS-7: No flowsheet data found.    Patient under 40 years of age: Routine Mammogram Screening not recommended.   Pertinent mammograms are reviewed under the imaging tab.    History of abnormal Pap smear: NO - age 30-65 PAP every 5 years with negative HPV co-testing recommended  PAP / HPV 8/2/2018   PAP (Historical) NIL     Reviewed and updated as needed this visit by clinical staff   Tobacco  Allergies    Med Hx            Reviewed and updated as needed this visit by Provider                     Review of Systems   Constitutional: Negative for chills and fever.   HENT: Negative for congestion, ear pain, hearing loss and sore throat.    Eyes: Negative for pain and visual disturbance.   Respiratory: Negative for cough and shortness of breath.    Cardiovascular: Negative for chest pain, palpitations and peripheral edema.   Gastrointestinal: Negative for abdominal pain, constipation, diarrhea, heartburn, hematochezia and nausea.   Genitourinary: Negative for dysuria, frequency, genital sores, hematuria and urgency.   Musculoskeletal: Negative for arthralgias, joint swelling and myalgias.   Skin: Negative for rash.   Neurological: Positive for headaches. Negative for dizziness, weakness and paresthesias.   Psychiatric/Behavioral: Positive for mood changes. The patient is nervous/anxious.           OBJECTIVE:   /81   Pulse 80   Temp 98  F (36.7  C) (Tympanic)   Resp 18   Ht 1.674 m (5' 5.91\")   Wt 117 kg (258 lb)   LMP 02/17/2022   SpO2 97%   BMI 41.76 kg/m  "   Physical Exam  GENERAL: healthy, alert and no distress  EYES: Eyes grossly normal to inspection, PERRL and conjunctivae and sclerae normal  HENT: ear canals and TM's normal, nose and mouth without ulcers or lesions  NECK: no adenopathy, no asymmetry, masses, or scars and thyroid normal to palpation  RESP: lungs clear to auscultation - no rales, rhonchi or wheezes  CV: regular rate and rhythm, normal S1 S2, no S3 or S4, no murmur, click or rub, no peripheral edema and peripheral pulses strong  ABDOMEN: soft, nontender, no hepatosplenomegaly, no masses and bowel sounds normal   (female): normal female external genitalia, normal urethral meatus, vaginal mucosa pink, moist, well rugated, and normal cervix/adnexa/uterus without masses or discharge  MS: no gross musculoskeletal defects noted, no edema  SKIN: no suspicious lesions or rashes  NEURO: Normal strength and tone, mentation intact and speech normal  PSYCH: mentation appears normal, affect normal/bright    Diagnostic Test Results:  Labs reviewed in Epic    ASSESSMENT/PLAN:       ICD-10-CM    1. Routine general medical examination at a health care facility  Z00.00    2. MAR (generalized anxiety disorder)   Counseled pt extensively regarding nature and course of mood disorders and evidence based recommendations for management including therapy and medications.  Discussed antidepressant options, including benefits, risks, and common side effects.  Patient-centered decision to initiate prozac based on lower SE of weight gain.  Instructions provided to self-titrate dose based on benefits and side effects.  Did not find therapy helpful in recent past.    If all is well, will follow-up in clinic in 4-8 weeks, sooner if symptoms worsen or as needed.   F41.1 FLUoxetine (PROZAC) 10 MG capsule   3. Mixed hyperlipidemia  E78.2 Lipid panel reflex to direct LDL Fasting     Comprehensive metabolic panel (BMP + Alb, Alk Phos, ALT, AST, Total. Bili, TP)     rosuvastatin  "(CRESTOR) 20 MG tablet     Lipid panel reflex to direct LDL Fasting     Comprehensive metabolic panel (BMP + Alb, Alk Phos, ALT, AST, Total. Bili, TP)    Did not tolerate statin.  Will check lipids - If LDL > 190, will re-challenge with pravastatin.  Discussed w/ pt.   4. BMI 54  E66.01 buPROPion (WELLBUTRIN XL) 300 MG 24 hr tablet    Stable, refilled   5. Asthma, unspecified asthma severity, unspecified whether complicated, unspecified whether persistent  J45.909 albuterol (PROAIR HFA/PROVENTIL HFA/VENTOLIN HFA) 108 (90 Base) MCG/ACT inhaler    stable, refilled albuterol   6. Encounter for surveillance of contraceptive pills   - due to PMS, will trial continuous OCP without placebo withdrawal week. Z30.41 levonorgestrel-ethinyl estradiol (ALTAVERA) 0.15-30 MG-MCG tablet   7. Screen for STD (sexually transmitted disease)  Z11.3 HIV Antigen Antibody Combo     Treponema Abs w Reflex to RPR and Titer     NEISSERIA GONORRHOEA PCR     CHLAMYDIA TRACHOMATIS PCR     HIV Antigen Antibody Combo     Treponema Abs w Reflex to RPR and Titer   8. Cervical cancer screening  Z12.4 Pap Screen with HPV - recommended age 30 - 65 years     Pap screen with HPV - recommended age 30 - 65 years   9. Need for hepatitis C screening test  Z11.59 Hepatitis C Screen Reflex to HCV RNA Quant and Genotype             COUNSELING:  Reviewed preventive health counseling, as reflected in patient instructions    Estimated body mass index is 41.76 kg/m  as calculated from the following:    Height as of this encounter: 1.674 m (5' 5.91\").    Weight as of this encounter: 117 kg (258 lb).    Weight management plan: Discussed healthy diet and exercise guidelines    She reports that she has never smoked. She has never used smokeless tobacco.      Counseling Resources:  ATP IV Guidelines  Pooled Cohorts Equation Calculator  Breast Cancer Risk Calculator  BRCA-Related Cancer Risk Assessment: FHS-7 Tool  FRAX Risk Assessment  ICSI Preventive " Guidelines  Dietary Guidelines for Americans, 2010  USDA's MyPlate  ASA Prophylaxis  Lung CA Screening    Marlena Leonardo MD  [facility]

## 2022-03-01 LAB
C TRACH DNA SPEC QL NAA+PROBE: NEGATIVE
N GONORRHOEA DNA SPEC QL NAA+PROBE: NEGATIVE

## 2022-03-01 ASSESSMENT — ANXIETY QUESTIONNAIRES: GAD7 TOTAL SCORE: 14

## 2022-03-02 LAB
BKR LAB AP GYN ADEQUACY: NORMAL
BKR LAB AP GYN INTERPRETATION: NORMAL
BKR LAB AP HPV REFLEX: NORMAL
BKR LAB AP PREVIOUS ABNORMAL: NORMAL
PATH REPORT.COMMENTS IMP SPEC: NORMAL
PATH REPORT.COMMENTS IMP SPEC: NORMAL
PATH REPORT.RELEVANT HX SPEC: NORMAL

## 2022-03-04 LAB
HUMAN PAPILLOMA VIRUS 16 DNA: NEGATIVE
HUMAN PAPILLOMA VIRUS 18 DNA: NEGATIVE
HUMAN PAPILLOMA VIRUS FINAL DIAGNOSIS: NORMAL
HUMAN PAPILLOMA VIRUS OTHER HR: NEGATIVE

## 2022-04-22 ENCOUNTER — VIRTUAL VISIT (OUTPATIENT)
Dept: PEDIATRICS | Facility: CLINIC | Age: 32
End: 2022-04-22
Payer: COMMERCIAL

## 2022-04-22 DIAGNOSIS — F41.1 GAD (GENERALIZED ANXIETY DISORDER): ICD-10-CM

## 2022-04-22 DIAGNOSIS — Z23 NEED FOR VACCINATION: Primary | ICD-10-CM

## 2022-04-22 PROCEDURE — 99214 OFFICE O/P EST MOD 30 MIN: CPT | Mod: 95 | Performed by: INTERNAL MEDICINE

## 2022-04-22 RX ORDER — FLUOXETINE 40 MG/1
40 CAPSULE ORAL DAILY
Qty: 30 CAPSULE | Refills: 1 | Status: SHIPPED | OUTPATIENT
Start: 2022-04-22 | End: 2022-06-03

## 2022-04-22 NOTE — PROGRESS NOTES
DATE OF SERVICE:  08/06/2018.    A waking EEG with photic stimulation and hyperventilation is submitted in this   5-year-old.  The waking posterior rhythm was 8 cycles per second.  Photic   stimulation and hyperventilation are unremarkable.  Sleep is not seen.  There   were some involuntary movements during the EEG, but there were no associated EEG   changes.    IMPRESSION:  Normal EEG.      SAMMY  dd: 08/06/2018 13:35:02 (CDT)  td: 08/06/2018 15:32:36 (CDT)  Doc ID   #[id number]  Job ID #[id number]    CC:     This office note has been dictated.     Marlee is a 32 year old who is being evaluated via a billable video visit.        Video Start Time: 8:40 AM    Assessment & Plan     MAR (generalized anxiety disorder)  Improved slightly, but still having symptoms.  Will increase dose to 40 mg.  Will contact me if she has side effects, otherwise we will check in in 6 weeks.  - FLUoxetine (PROZAC) 40 MG capsule; Take 1 capsule (40 mg) by mouth daily    Need for vaccination  Due for pneumovax due to asthma - pt will make nurse only viist.         See Patient Instructions    Return in about 6 weeks (around 6/3/2022) for Video Visit.    Marlena Leonardo MD  [facility] Whalen is a 32 year old who presents for the following health issues     HPI     Prozac - started taking it and is now on 20 mg.  Can tell that it's helped a little.  Still feeling anxious and with labile mood.  No side effects.    No nausea, dizziness, agitation, sleep disturbances.    PHQ 2/28/2022   PHQ-9 Total Score 3   Q9: Thoughts of better off dead/self-harm past 2 weeks Not at all     MAR-7 SCORE 2/28/2022   Total Score 14         Review of Systems   All other systems on a 10-point review are negative, unless otherwise noted in HPI        Objective           Vitals:  No vitals were obtained today due to virtual visit.    Physical Exam   GENERAL: Healthy, alert and no distress  EYES: Eyes grossly normal to inspection.  No discharge or erythema, or obvious scleral/conjunctival abnormalities.  RESP: No audible wheeze, cough, or visible cyanosis.  No visible retractions or increased work of breathing.    SKIN: Visible skin clear. No significant rash, abnormal pigmentation or lesions.  NEURO: Cranial nerves grossly intact.  Mentation and speech appropriate for age.  PSYCH: Mentation appears normal, affect normal/bright, judgement and insight intact, normal speech and appearance well-groomed.                Video-Visit Details    Type of service:  Video Visit    Video  End Time:8:55 AM    Originating Location (pt. Location): Home    Distant Location (provider location):  [facility]     Platform used for Video Visit: [employer]

## 2022-04-22 NOTE — Clinical Note
Please call - needs f/up video visit in 6 weeks (please put f/up anxiety/depression in appt note) and nurse only visit for pneumovax at her convenience (order placed).  Thank you.

## 2022-06-03 ENCOUNTER — VIRTUAL VISIT (OUTPATIENT)
Dept: PEDIATRICS | Facility: CLINIC | Age: 32
End: 2022-06-03
Payer: COMMERCIAL

## 2022-06-03 DIAGNOSIS — E66.01 MORBID OBESITY (H): ICD-10-CM

## 2022-06-03 DIAGNOSIS — F41.1 GAD (GENERALIZED ANXIETY DISORDER): ICD-10-CM

## 2022-06-03 PROCEDURE — 99213 OFFICE O/P EST LOW 20 MIN: CPT | Mod: 95 | Performed by: INTERNAL MEDICINE

## 2022-06-03 RX ORDER — FLUOXETINE 40 MG/1
40 CAPSULE ORAL DAILY
Qty: 90 CAPSULE | Refills: 3 | Status: SHIPPED | OUTPATIENT
Start: 2022-06-03 | End: 2023-04-27

## 2022-06-03 ASSESSMENT — ANXIETY QUESTIONNAIRES
GAD7 TOTAL SCORE: 0
6. BECOMING EASILY ANNOYED OR IRRITABLE: NOT AT ALL
5. BEING SO RESTLESS THAT IT IS HARD TO SIT STILL: NOT AT ALL
2. NOT BEING ABLE TO STOP OR CONTROL WORRYING: NOT AT ALL
3. WORRYING TOO MUCH ABOUT DIFFERENT THINGS: NOT AT ALL
7. FEELING AFRAID AS IF SOMETHING AWFUL MIGHT HAPPEN: NOT AT ALL
GAD7 TOTAL SCORE: 0
1. FEELING NERVOUS, ANXIOUS, OR ON EDGE: NOT AT ALL

## 2022-06-03 ASSESSMENT — PATIENT HEALTH QUESTIONNAIRE - PHQ9
SUM OF ALL RESPONSES TO PHQ QUESTIONS 1-9: 0
5. POOR APPETITE OR OVEREATING: NOT AT ALL

## 2022-06-03 NOTE — PROGRESS NOTES
Marlee is a 32 year old who is being evaluated via a billable video visit.        Video Start Time: 8:36 AM    Assessment & Plan     MAR (generalized anxiety disorder)  Doing well.  Not in therapy, was in the past and wasn't helpful at that time.  Will hold off on therapy right now and focus on healthy behaviors.    - FLUoxetine (PROZAC) 40 MG capsule; Take 1 capsule (40 mg) by mouth daily         Patient Instructions     Continue medications as you are    Today we discussed addition factors that can improve mental health including:  - Stress management  - Mindfulness and other relaxation techniques  - Improving sleep habits  - Adequate exercise (At least 2 1/2 hours per week)  - Increasing fresh fruits and veggies in daily diet    Follow-up at your next physical.        Return in about 9 months (around 3/3/2023) for Preventive Visit.    [facility] MD  [facility]    Blake Whalen is a 32 year old who presents for the following health issues     HPI     Doing really well - essentially symptoms are in remission on current dose.  Therapy - in past.  Wasn't helpful.  Is holding off on therapy right now.  Will work on preventive mental health techniques.      Review of Systems   All other systems on a 10-point review are negative, unless otherwise noted in HPI        Objective           Vitals:  No vitals were obtained today due to virtual visit.    Physical Exam   GENERAL: Healthy, alert and no distress  EYES: Eyes grossly normal to inspection.  No discharge or erythema, or obvious scleral/conjunctival abnormalities.  RESP: No audible wheeze, cough, or visible cyanosis.  No visible retractions or increased work of breathing.    SKIN: Visible skin clear. No significant rash, abnormal pigmentation or lesions.  NEURO: Cranial nerves grossly intact.  Mentation and speech appropriate for age.  PSYCH: Mentation appears normal, affect normal/bright, judgement and insight intact, normal speech  and appearance well-groomed.                Video-Visit Details    Type of service:  Video Visit    Video End Time:8:51 AM    Originating Location (pt. Location): Home    Distant Location (provider location):  [facility]     Platform used for Video Visit: [employer]

## 2022-06-03 NOTE — PATIENT INSTRUCTIONS
Continue medications as you are    Today we discussed addition factors that can improve mental health including:  - Stress management  - Mindfulness and other relaxation techniques  - Improving sleep habits  - Adequate exercise (At least 2 1/2 hours per week)  - Increasing fresh fruits and veggies in daily diet    Follow-up at your next physical.

## 2022-07-24 NOTE — TELEPHONE ENCOUNTER
"Requested Prescriptions   Pending Prescriptions Disp Refills     levonorgestrel-ethinyl estradiol (ALTAVERA) 0.15-30 MG-MCG tablet    Last Written Prescription Date:  8/2/2018  Last Fill Quantity: 28,  # refills: 11   Last office visit: 11/9/2018 with prescribing provider:  Mike Leonardo Mai     Future Office Visit:     28 tablet 11     Sig: Take 1 tablet by mouth daily       Contraceptives Protocol Passed - 7/22/2019 10:10 AM        Passed - Patient is not a current smoker if age is 35 or older        Passed - Recent (12 mo) or future (30 days) visit within the authorizing provider's specialty     Patient had office visit in the last 12 months or has a visit in the next 30 days with authorizing provider or within the authorizing provider's specialty.  See \"Patient Info\" tab in inbasket, or \"Choose Columns\" in Meds & Orders section of the refill encounter.              Passed - Medication is active on med list        Passed - No active pregnancy on record        Passed - No positive pregnancy test in past 12 months          "
Prescription approved per [facility] Refill Protocol.    Miley Austin RN on 7/24/2019 at 7:20 AM    
aerobic capacity/endurance/gait, locomotion, and balance/muscle strength

## 2022-09-03 ENCOUNTER — HEALTH MAINTENANCE LETTER (OUTPATIENT)
Age: 32
End: 2022-09-03

## 2023-03-28 DIAGNOSIS — E66.01 MORBID OBESITY (H): ICD-10-CM

## 2023-03-28 DIAGNOSIS — Z30.41 ENCOUNTER FOR SURVEILLANCE OF CONTRACEPTIVE PILLS: ICD-10-CM

## 2023-03-29 RX ORDER — BUPROPION HYDROCHLORIDE 300 MG/1
300 TABLET ORAL EVERY MORNING
Qty: 90 TABLET | Refills: 0 | Status: SHIPPED | OUTPATIENT
Start: 2023-03-29 | End: 2023-04-27

## 2023-03-29 RX ORDER — LEVONORGESTREL AND ETHINYL ESTRADIOL 0.15-0.03
1 KIT ORAL DAILY
Qty: 84 TABLET | Refills: 0 | Status: SHIPPED | OUTPATIENT
Start: 2023-03-29 | End: 2023-04-27

## 2023-03-29 NOTE — TELEPHONE ENCOUNTER
Patient has upcoming appointment. Prescription approved per [facility] Refill Protocol.  Burton MELVIN RN 3/29/2023 at 3:28 PM

## 2023-03-29 NOTE — TELEPHONE ENCOUNTER
Patient has upcoming appointment. Prescription approved per [facility] Refill Protocol.  Burton MELVIN RN 3/29/2023 at 3:26 PM

## 2023-04-27 ENCOUNTER — OFFICE VISIT (OUTPATIENT)
Dept: PEDIATRICS | Facility: CLINIC | Age: 33
End: 2023-04-27
Payer: COMMERCIAL

## 2023-04-27 VITALS
TEMPERATURE: 98.3 F | WEIGHT: 248.3 LBS | HEART RATE: 84 BPM | RESPIRATION RATE: 18 BRPM | BODY MASS INDEX: 38.97 KG/M2 | HEIGHT: 67 IN | DIASTOLIC BLOOD PRESSURE: 80 MMHG | SYSTOLIC BLOOD PRESSURE: 138 MMHG | OXYGEN SATURATION: 98 %

## 2023-04-27 DIAGNOSIS — Z00.00 ROUTINE GENERAL MEDICAL EXAMINATION AT A HEALTH CARE FACILITY: Primary | ICD-10-CM

## 2023-04-27 DIAGNOSIS — Z30.41 ENCOUNTER FOR SURVEILLANCE OF CONTRACEPTIVE PILLS: ICD-10-CM

## 2023-04-27 DIAGNOSIS — J45.909 ASTHMA, UNSPECIFIED ASTHMA SEVERITY, UNSPECIFIED WHETHER COMPLICATED, UNSPECIFIED WHETHER PERSISTENT: ICD-10-CM

## 2023-04-27 DIAGNOSIS — E66.09 CLASS 2 OBESITY DUE TO EXCESS CALORIES WITHOUT SERIOUS COMORBIDITY WITH BODY MASS INDEX (BMI) OF 38.0 TO 38.9 IN ADULT: ICD-10-CM

## 2023-04-27 DIAGNOSIS — E66.812 CLASS 2 OBESITY DUE TO EXCESS CALORIES WITHOUT SERIOUS COMORBIDITY WITH BODY MASS INDEX (BMI) OF 38.0 TO 38.9 IN ADULT: ICD-10-CM

## 2023-04-27 DIAGNOSIS — F41.1 GAD (GENERALIZED ANXIETY DISORDER): ICD-10-CM

## 2023-04-27 PROCEDURE — 99395 PREV VISIT EST AGE 18-39: CPT | Performed by: NURSE PRACTITIONER

## 2023-04-27 PROCEDURE — 99214 OFFICE O/P EST MOD 30 MIN: CPT | Mod: 25 | Performed by: NURSE PRACTITIONER

## 2023-04-27 RX ORDER — ALBUTEROL SULFATE 90 UG/1
AEROSOL, METERED RESPIRATORY (INHALATION)
Qty: 8.5 G | Refills: 4 | Status: SHIPPED | OUTPATIENT
Start: 2023-04-27 | End: 2024-08-12

## 2023-04-27 RX ORDER — LEVONORGESTREL AND ETHINYL ESTRADIOL 0.15-0.03
1 KIT ORAL DAILY
Qty: 84 TABLET | Refills: 3 | Status: SHIPPED | OUTPATIENT
Start: 2023-04-27 | End: 2024-04-15

## 2023-04-27 RX ORDER — BUPROPION HYDROCHLORIDE 300 MG/1
300 TABLET ORAL EVERY MORNING
Qty: 90 TABLET | Refills: 3 | Status: SHIPPED | OUTPATIENT
Start: 2023-04-27 | End: 2024-05-24

## 2023-04-27 RX ORDER — FLUOXETINE 40 MG/1
40 CAPSULE ORAL DAILY
Qty: 90 CAPSULE | Refills: 3 | Status: SHIPPED | OUTPATIENT
Start: 2023-04-27 | End: 2024-08-12

## 2023-04-27 SDOH — HEALTH STABILITY: PHYSICAL HEALTH: ON AVERAGE, HOW MANY DAYS PER WEEK DO YOU ENGAGE IN MODERATE TO STRENUOUS EXERCISE (LIKE A BRISK WALK)?: 1 DAY

## 2023-04-27 SDOH — ECONOMIC STABILITY: INCOME INSECURITY: IN THE LAST 12 MONTHS, WAS THERE A TIME WHEN YOU WERE NOT ABLE TO PAY THE MORTGAGE OR RENT ON TIME?: NO

## 2023-04-27 SDOH — ECONOMIC STABILITY: FOOD INSECURITY: WITHIN THE PAST 12 MONTHS, THE FOOD YOU BOUGHT JUST DIDN'T LAST AND YOU DIDN'T HAVE MONEY TO GET MORE.: NEVER TRUE

## 2023-04-27 SDOH — ECONOMIC STABILITY: INCOME INSECURITY: HOW HARD IS IT FOR YOU TO PAY FOR THE VERY BASICS LIKE FOOD, HOUSING, MEDICAL CARE, AND HEATING?: NOT VERY HARD

## 2023-04-27 SDOH — ECONOMIC STABILITY: FOOD INSECURITY: WITHIN THE PAST 12 MONTHS, YOU WORRIED THAT YOUR FOOD WOULD RUN OUT BEFORE YOU GOT MONEY TO BUY MORE.: NEVER TRUE

## 2023-04-27 SDOH — HEALTH STABILITY: PHYSICAL HEALTH: ON AVERAGE, HOW MANY MINUTES DO YOU ENGAGE IN EXERCISE AT THIS LEVEL?: 30 MIN

## 2023-04-27 ASSESSMENT — SOCIAL DETERMINANTS OF HEALTH (SDOH)
ARE YOU MARRIED, WIDOWED, DIVORCED, SEPARATED, NEVER MARRIED, OR LIVING WITH A PARTNER?: NEVER MARRIED
DO YOU BELONG TO ANY CLUBS OR ORGANIZATIONS SUCH AS CHURCH GROUPS UNIONS, FRATERNAL OR ATHLETIC GROUPS, OR SCHOOL GROUPS?: NO
IN A TYPICAL WEEK, HOW MANY TIMES DO YOU TALK ON THE PHONE WITH FAMILY, FRIENDS, OR NEIGHBORS?: MORE THAN THREE TIMES A WEEK
HOW OFTEN DO YOU GET TOGETHER WITH FRIENDS OR RELATIVES?: TWICE A WEEK
HOW OFTEN DO YOU ATTEND CHURCH OR RELIGIOUS SERVICES?: MORE THAN 4 TIMES PER YEAR

## 2023-04-27 ASSESSMENT — ENCOUNTER SYMPTOMS
SORE THROAT: 0
FREQUENCY: 0
BREAST MASS: 0
DYSURIA: 0
PARESTHESIAS: 0
CHILLS: 0
WEAKNESS: 0
HEADACHES: 0
HEMATURIA: 0
PALPITATIONS: 0
NAUSEA: 0
COUGH: 0
NERVOUS/ANXIOUS: 0
HEMATOCHEZIA: 0
DIZZINESS: 0
ABDOMINAL PAIN: 0
JOINT SWELLING: 0
ARTHRALGIAS: 0
SHORTNESS OF BREATH: 0
HEARTBURN: 0
FEVER: 0
EYE PAIN: 0
DIARRHEA: 0
CONSTIPATION: 0
MYALGIAS: 0

## 2023-04-27 ASSESSMENT — ASTHMA QUESTIONNAIRES
QUESTION_3 LAST FOUR WEEKS HOW OFTEN DID YOUR ASTHMA SYMPTOMS (WHEEZING, COUGHING, SHORTNESS OF BREATH, CHEST TIGHTNESS OR PAIN) WAKE YOU UP AT NIGHT OR EARLIER THAN USUAL IN THE MORNING: NOT AT ALL
QUESTION_1 LAST FOUR WEEKS HOW MUCH OF THE TIME DID YOUR ASTHMA KEEP YOU FROM GETTING AS MUCH DONE AT WORK, SCHOOL OR AT HOME: NONE OF THE TIME
QUESTION_5 LAST FOUR WEEKS HOW WOULD YOU RATE YOUR ASTHMA CONTROL: COMPLETELY CONTROLLED
QUESTION_2 LAST FOUR WEEKS HOW OFTEN HAVE YOU HAD SHORTNESS OF BREATH: ONCE OR TWICE A WEEK
QUESTION_4 LAST FOUR WEEKS HOW OFTEN HAVE YOU USED YOUR RESCUE INHALER OR NEBULIZER MEDICATION (SUCH AS ALBUTEROL): TWO OR THREE TIMES PER WEEK
ACT_TOTALSCORE: 22
ACT_TOTALSCORE: 22

## 2023-04-27 ASSESSMENT — LIFESTYLE VARIABLES
AUDIT-C TOTAL SCORE: 2
HOW OFTEN DO YOU HAVE A DRINK CONTAINING ALCOHOL: 2-4 TIMES A MONTH
HOW MANY STANDARD DRINKS CONTAINING ALCOHOL DO YOU HAVE ON A TYPICAL DAY: 1 OR 2
SKIP TO QUESTIONS 9-10: 1
HOW OFTEN DO YOU HAVE SIX OR MORE DRINKS ON ONE OCCASION: NEVER

## 2023-04-27 ASSESSMENT — PAIN SCALES - GENERAL: PAINLEVEL: NO PAIN (0)

## 2023-04-27 NOTE — PROGRESS NOTES
SUBJECTIVE:   CC: Marlee is an 33 year old who presents for preventive health visit.   Patient has been advised of split billing requirements and indicates understanding: Yes     Healthy Habits:     Getting at least 3 servings of Calcium per day:  Yes    Bi-annual eye exam:  NO    Dental care twice a year:  Yes    Sleep apnea or symptoms of sleep apnea:  Excessive snoring    Diet:  Regular (no restrictions)    Frequency of exercise:  2-3 days/week    Duration of exercise:  15-30 minutes    Taking medications regularly:  Yes    Medication side effects:  None    PHQ-2 Total Score: 0    Additional concerns today:  No    Since Jan 2021 has lost about 100 pounds.     #Asthma  Uses albuterol prior to exercise.         5/6/2021     7:34 AM 2/28/2022     8:55 AM 4/27/2023     1:19 PM   ACT Total Scores   ACT TOTAL SCORE (Goal Greater than or Equal to 20) 22 23 22   In the past 12 months, how many times did you visit the emergency room for your asthma without being admitted to the hospital? 0 0 0   In the past 12 months, how many times were you hospitalized overnight because of your asthma? 0 0 0         Today's PHQ-2 Score:       4/27/2023     1:14 PM   PHQ-2 ( 1999 Pfizer)   Q1: Little interest or pleasure in doing things 0   Q2: Feeling down, depressed or hopeless 0   PHQ-2 Score 0   Q1: Little interest or pleasure in doing things Not at all   Q2: Feeling down, depressed or hopeless Not at all   PHQ-2 Score 0       Social History     Tobacco Use     Smoking status: Never     Smokeless tobacco: Never   Vaping Use     Vaping status: Not on file   Substance Use Topics     Alcohol use: No     Comment: rarely             4/27/2023     1:13 PM   Alcohol Use   Prescreen: >3 drinks/day or >7 drinks/week? No     Reviewed orders with patient.  Reviewed health maintenance and updated orders accordingly - Yes  BP Readings from Last 3 Encounters:   04/27/23 138/80   02/28/22 128/81   11/29/19 124/78    Wt Readings from Last 3  Encounters:   23 112.6 kg (248 lb 4.8 oz)   22 117 kg (258 lb)   19 135 kg (297 lb 9.6 oz)           Breast Cancer Screenin/28/2022     7:18 AM   Breast CA Risk Assessment (FHS-7)   Do you have a family history of breast, colon, or ovarian cancer? No / Unknown       Patient under 40 years of age: Routine Mammogram Screening not recommended.   Pertinent mammograms are reviewed under the imaging tab.    History of abnormal Pap smear: NO - age 30-65 PAP every 5 years with negative HPV co-testing recommended      Latest Ref Rng & Units 2022     7:18 AM 2018    11:14 AM   PAP / HPV   PAP  Negative for Intraepithelial Lesion or Malignancy (NILM)      PAP (Historical)   NIL     HPV 16 DNA Negative Negative      HPV 18 DNA Negative Negative      Other HR HPV Negative Negative        Reviewed and updated as needed this visit by clinical staff                  Reviewed and updated as needed this visit by Provider                 Patient Active Problem List   Diagnosis     Morbid obesity (H)     Menstrual migraine without status migrainosus, not intractable     Mild intermittent asthma with exacerbation     Past Medical History:   Diagnosis Date     Uncomplicated asthma      Past Surgical History:   Procedure Laterality Date     EYE SURGERY  2018    bilateral lasik     Family History   Problem Relation Age of Onset     Diabetes Father      Obesity Father      Hyperlipidemia Mother      Obesity Mother      Asthma Sister      Obesity Sister      Social History     Socioeconomic History     Marital status: Single     Spouse name: Not on file     Number of children: Not on file     Years of education: Not on file     Highest education level: Not on file   Occupational History     Not on file   Tobacco Use     Smoking status: Never     Smokeless tobacco: Never   Vaping Use     Vaping status: Never Used   Substance and Sexual Activity     Alcohol use: No     Comment: rarely     Drug use:  No     Sexual activity: Yes     Partners: Male     Birth control/protection: Condom, Pill   Other Topics Concern     Parent/sibling w/ CABG, MI or angioplasty before 65F 55M? Yes     Comment: Father   Social History Narrative    [employer].     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     Has been there 12 years.         Danielle Lane, DNP, APRN, CNP    04/27/23     Social Determinants of Health     Financial Resource Strain: Low Risk  (4/27/2023)    Overall Financial Resource Strain (CARDIA)      Difficulty of Paying Living Expenses: Not very hard   Food Insecurity: No Food Insecurity (4/27/2023)    Hunger Vital Sign      Worried About Running Out of Food in the Last Year: Never true      Ran Out of Food in the Last Year: Never true   Transportation Needs: No Transportation Needs (4/27/2023)    PRAPARE - Transportation      Lack of Transportation (Medical): No      Lack of Transportation (Non-Medical): No   Physical Activity: Insufficiently Active (4/27/2023)    Exercise Vital Sign      Days of Exercise per Week: 1 day      Minutes of Exercise per Session: 30 min   Stress: No Stress Concern Present (4/27/2023)    [employer] [place] [employer] - Occupational Stress Questionnaire      Feeling of Stress : Only a little   Social Connections: Moderately Isolated (4/27/2023)    Social Connection and Isolation Panel [NHANES]      Frequency of Communication with Friends and Family: More than three times a week      Frequency of Social Gatherings with Friends and Family: Twice a week      Attends Lutheran Services: More than 4 times per year      Active Member of Clubs or Organizations: No      Attends Club or Organization Meetings: Not on file      Marital Status: Never    Intimate Partner Violence: Not on file   Housing Stability: Low Risk  (4/27/2023)    Housing Stability Vital Sign      Unable to Pay for Housing in the Last Year: No       "Number of Places Lived in the Last Year: 1      Unstable Housing in the Last Year: No     Current Outpatient Medications   Medication     albuterol (PROAIR HFA/PROVENTIL HFA/VENTOLIN HFA) 108 (90 Base) MCG/ACT inhaler     buPROPion (WELLBUTRIN XL) 300 MG 24 hr tablet     FLUoxetine (PROZAC) 40 MG capsule     levonorgestrel-ethinyl estradiol (ALTAVERA) 0.15-30 MG-MCG tablet     No current facility-administered medications for this visit.      No Known Allergies      Review of Systems   Constitutional: Negative for chills and fever.   HENT: Negative for congestion, ear pain, hearing loss and sore throat.    Eyes: Negative for pain and visual disturbance.   Respiratory: Negative for cough and shortness of breath.    Cardiovascular: Negative for chest pain, palpitations and peripheral edema.   Gastrointestinal: Negative for abdominal pain, constipation, diarrhea, heartburn, hematochezia and nausea.   Breasts:  Negative for tenderness, breast mass and discharge.   Genitourinary: Negative for dysuria, frequency, genital sores, hematuria, pelvic pain, urgency, vaginal bleeding and vaginal discharge.   Musculoskeletal: Negative for arthralgias, joint swelling and myalgias.   Skin: Negative for rash.   Neurological: Negative for dizziness, weakness, headaches and paresthesias.   Psychiatric/Behavioral: Negative for mood changes. The patient is not nervous/anxious.         OBJECTIVE:   /80 (BP Location: Right arm, Patient Position: Sitting, Cuff Size: Adult Large)   Pulse 84   Temp 98.3  F (36.8  C) (Tympanic)   Resp 18   Ht 1.71 m (5' 7.32\")   Wt 112.6 kg (248 lb 4.8 oz)   LMP 04/20/2023   SpO2 98%   BMI 38.52 kg/m    Physical Exam  Constitutional: appears to be in no acute distress, comfortable, pleasant.   Eyes: anicteric, conjunctiva clear without drainage or erythema. MACHO.   Ears, Nose and Throat: tympanic membranes gray with LR,  nose without nasal discharge. OP: no erythema to posterior pharynx, negative " post nasal drainage, tonsils +1 no erythema or exudate.  Neck: supple, thyroid palpable,not enlarged, no nodules   Breast: Exam deferred (deferred after discussion of exam options with patient, no symptoms or concerns).   Cardiovascular: regular rate and rhythm, normal S1 and S2, no murmurs, rubs or gallops, peripheral pulses full and symmetric; negative peripheral edema   Respiratory: Air entry throughout. Breathing pattern unlabored without the use of accessory muscles. Clear to auscultation A and P, no wheezes or crackles, normal breath sounds.    Gastrointestinal: rounded, soft. Positive bowel sounds x4, nontender, no masses.   Genitourinary: Exam deferred (deferred after discussion of exam options with patient, no symptoms or concerns, pap up to date).   Musculoskeletal: full range of motion, no edema.   Skin: pink, turgor smooth and elastic. Negative for lesions or dryness.  Neurological: normal gait, no tremor.   Psychological: appropriate mood and affect.   Lymphatic: no cervical, axillary, supraclavicular, or infraclavicular lymphadenopathy.    Diagnostic Test Results:  Labs reviewed in Epic    ASSESSMENT/PLAN:   (Z00.00) Routine general medical examination at a health care facility  (primary encounter diagnosis)  Age appropriate screening and preventative care have been addressed today. Vaccinations have been reviewed, declines covid booster. Patient has been advised to follow a balanced diet with reduction in cholesterol, and reasonable portion sizes. They have been advised to undertake routine aerobic activity and they were counseled on healthy weight maintenance. Recommend annual vision exams as well as biannual dental exams. They will follow up for annual physical again in one year.   - Pap utd    (Z30.41) Encounter for surveillance of contraceptive pills  Taking as prescribed, no concerns. No contraindications to taking PETERSON identified. Refilled.  - levonorgestrel-ethinyl estradiol (ALTAVERA) 0.15-30  MG-MCG tablet          (E66.09,  Z68.38) Class 2 obesity due to excess calories without serious comorbidity with body mass index (BMI) of 38.0 to 38.9 in adult   Body mass index is 38.52 kg/m . Congratulated on her weight loss of nearly 100 pounds since January 2021. Encouraged to continue with healthy lifestyle.   - buPROPion (WELLBUTRIN XL) 300 MG 24 hr tablet  - Lipid panel reflex to direct LDL Fasting  - PRIMARY CARE FOLLOW-UP SCHEDULING          (F41.1) MAR (generalized anxiety disorder)  Chronic, stable. No concerns. Refilled.   - FLUoxetine (PROZAC) 40 MG capsule  - OFFICE/OUTPT VISIT,EST,LEVL IV          (J45.909) Asthma, unspecified asthma severity, unspecified whether complicated, unspecified whether persistent  Chronic, stable. ACT score at goal. Refilled albuterol.   - albuterol (PROAIR HFA/PROVENTIL HFA/VENTOLIN HFA) 108 (90 Base) MCG/ACT inhaler  - OFFICE/OUTPT VISIT,EST,LEVL IV            Follow-up:  - Return for fasting labs.       COUNSELING:  Reviewed preventive health counseling, as reflected in patient instructions  Special attention given to:        Regular exercise       Healthy diet/nutrition       Vision screening       Immunizations       Contraception       Consider Hep C screening for all patients one time for ages 18-79 years    She reports that she has never smoked. She has never used smokeless tobacco.             HUI Albarado CNP  [facility]

## 2023-05-01 RX ORDER — LEVONORGESTREL AND ETHINYL ESTRADIOL 0.15-0.03
KIT ORAL
Qty: 112 TABLET | OUTPATIENT
Start: 2023-05-01

## 2023-05-01 NOTE — TELEPHONE ENCOUNTER
This is a duplicate refill request for birth control, refills sent to the same pharmacy on 4/27/23.

## 2023-08-08 ENCOUNTER — TRANSFERRED RECORDS (OUTPATIENT)
Dept: HEALTH INFORMATION MANAGEMENT | Facility: CLINIC | Age: 33
End: 2023-08-08
Payer: COMMERCIAL

## 2023-12-01 ENCOUNTER — OFFICE VISIT (OUTPATIENT)
Dept: PEDIATRICS | Facility: CLINIC | Age: 33
End: 2023-12-01
Payer: COMMERCIAL

## 2023-12-01 VITALS
TEMPERATURE: 97.9 F | WEIGHT: 280.8 LBS | HEART RATE: 85 BPM | HEIGHT: 67 IN | OXYGEN SATURATION: 99 % | BODY MASS INDEX: 44.07 KG/M2 | DIASTOLIC BLOOD PRESSURE: 84 MMHG | RESPIRATION RATE: 18 BRPM | SYSTOLIC BLOOD PRESSURE: 135 MMHG

## 2023-12-01 DIAGNOSIS — J45.21 MILD INTERMITTENT ASTHMA WITH EXACERBATION: ICD-10-CM

## 2023-12-01 DIAGNOSIS — J03.90 TONSILLITIS: ICD-10-CM

## 2023-12-01 DIAGNOSIS — G43.829 MENSTRUAL MIGRAINE WITHOUT STATUS MIGRAINOSUS, NOT INTRACTABLE: ICD-10-CM

## 2023-12-01 DIAGNOSIS — Z01.818 PREOP GENERAL PHYSICAL EXAM: Primary | ICD-10-CM

## 2023-12-01 LAB
BASOPHILS # BLD AUTO: 0.1 10E3/UL (ref 0–0.2)
BASOPHILS NFR BLD AUTO: 1 %
EOSINOPHIL # BLD AUTO: 0.3 10E3/UL (ref 0–0.7)
EOSINOPHIL NFR BLD AUTO: 3 %
ERYTHROCYTE [DISTWIDTH] IN BLOOD BY AUTOMATED COUNT: 12 % (ref 10–15)
HCG UR QL: NEGATIVE
HCT VFR BLD AUTO: 41.7 % (ref 35–47)
HGB BLD-MCNC: 13.7 G/DL (ref 11.7–15.7)
IMM GRANULOCYTES # BLD: 0 10E3/UL
IMM GRANULOCYTES NFR BLD: 0 %
LYMPHOCYTES # BLD AUTO: 3.7 10E3/UL (ref 0.8–5.3)
LYMPHOCYTES NFR BLD AUTO: 39 %
MCH RBC QN AUTO: 28.6 PG (ref 26.5–33)
MCHC RBC AUTO-ENTMCNC: 32.9 G/DL (ref 31.5–36.5)
MCV RBC AUTO: 87 FL (ref 78–100)
MONOCYTES # BLD AUTO: 0.6 10E3/UL (ref 0–1.3)
MONOCYTES NFR BLD AUTO: 6 %
NEUTROPHILS # BLD AUTO: 5 10E3/UL (ref 1.6–8.3)
NEUTROPHILS NFR BLD AUTO: 52 %
PLATELET # BLD AUTO: 512 10E3/UL (ref 150–450)
RBC # BLD AUTO: 4.79 10E6/UL (ref 3.8–5.2)
WBC # BLD AUTO: 9.6 10E3/UL (ref 4–11)

## 2023-12-01 PROCEDURE — 36415 COLL VENOUS BLD VENIPUNCTURE: CPT | Performed by: PHYSICIAN ASSISTANT

## 2023-12-01 PROCEDURE — 85025 COMPLETE CBC W/AUTO DIFF WBC: CPT | Performed by: PHYSICIAN ASSISTANT

## 2023-12-01 PROCEDURE — 81025 URINE PREGNANCY TEST: CPT | Performed by: PHYSICIAN ASSISTANT

## 2023-12-01 PROCEDURE — 99214 OFFICE O/P EST MOD 30 MIN: CPT | Performed by: PHYSICIAN ASSISTANT

## 2023-12-01 ASSESSMENT — PAIN SCALES - GENERAL: PAINLEVEL: NO PAIN (0)

## 2023-12-01 ASSESSMENT — ASTHMA QUESTIONNAIRES: ACT_TOTALSCORE: 23

## 2023-12-01 NOTE — COMMUNITY RESOURCES LIST (ENGLISH)
12/01/2023   [facility] [place] [facility]  N/A  For questions about this resource list or additional care needs, please contact your primary care clinic or care manager.  Phone: [phone]   Email: N/A   Address: [address] [place] [address]   Hours: N/A        Financial Stability       Utility payment assistance  1  [address] [place] [address] Distance: 3.72 miles      In-Person, Phone/Virtual   [address] [place] [address]  Language: English  Hours: Mon 8:00 AM - 4:00 PM , Tue 8:00 AM - 7:00 PM , Wed - Thu 8:00 AM - 4:00 PM  Fees: Free   Phone: (685) 185-3092 Email: info@[employer] Website: https://[employer]/resources/resource-centers/     2  Community Action Partnership ([facility]) [facility] - Energy Assistance Program (EAP) Distance: 4.09 miles      In-Person   [address] [place] [address]  Language: English, Grenadian  Hours: Mon - Fri 8:00 AM - 4:30 PM  Fees: Free   Phone: (160) 702-2956 Email: info@[employer].Kanshu Website: http://www.Lever.org          Important Numbers & Websites       Emergency Services   911  City Services   311  Poison Control   (989) 245-4969  Suicide Prevention Lifeline   (848) 549-1018 (TALK)  Child Abuse Hotline   (756) 654-5618 (4-A-Child)  Sexual Assault Hotline   (149) 953-2678 (HOPE)  National Runaway Safeline   (951) 697-7759 (RUNAWAY)  All-Options Talkline   (819) 361-8116  Substance Abuse Referral   (461) 708-3566 (HELP)

## 2023-12-01 NOTE — PROGRESS NOTES
[facility]  [address]  [address]  [place] [address]  Phone: [phone]  Fax: [phone]  Primary Provider: Mike Leonardo Mai  Pre-op Performing Provider: PEDRO LUIS JACOBSON      PREOPERATIVE EVALUATION:  Today's date: 12/1/2023    Marlee is a 33 year old, presenting for the following:  Pre-Op Exam (12/7/2023, [facility] fax number [phone])        12/1/2023     4:06 PM   Additional Questions   Roomed by MIRYAM Beltre   Accompanied by Sister Ba         12/1/2023     4:06 PM   Patient Reported Additional Medications   Patient reports taking the following new medications NA       Surgical Information:  Surgery/Procedure: Tonsillectomy bilateral   Surgery Location: [facility]   Surgeon: Dr. Alcaraz  Surgery Date: 12/8/2023  Time of Surgery: TBD  Where patient plans to recover: At home with family  Fax number for surgical facility: [phone]    Assessment & Plan     The proposed surgical procedure is considered INTERMEDIATE risk.    Preop general physical exam    - HCG qualitative urine; Future  - HCG qualitative urine  - CBC with platelets and differential; Future  - CBC with platelets and differential    Tonsillitis    - CBC with platelets and differential; Future  - CBC with platelets and differential    Mild intermittent asthma with exacerbation      Menstrual migraine without status migrainosus, not intractable              - No identified additional risk factors other than previously addressed    Antiplatelet or Anticoagulation Medication Instructions:   - Patient is on no antiplatelet or anticoagulation medications.    Additional Medication Instructions:  Patient is to take all scheduled medications on the day of surgery    RECOMMENDATION:  APPROVAL GIVEN to proceed with proposed procedure, without further diagnostic evaluation.          Subjective       HPI related to upcoming procedure: Patient is having surgery due to frequent tonsillitis.           12/1/2023     3:04 PM   Preop Questions   1. Have you ever had a heart attack or stroke? No   2. Have you ever had surgery on your heart or blood vessels, such as a stent placement, a coronary artery bypass, or surgery on an artery in your head, neck, heart, or legs? No   3. Do you have chest pain with activity? No   4. Do you have a history of  heart failure? No   5. Do you currently have a cold, bronchitis or symptoms of other infection? No   6. Do you have a cough, shortness of breath, or wheezing? No   7. Do you or anyone in your family have previous history of blood clots? No   8. Do you or does anyone in your family have a serious bleeding problem such as prolonged bleeding following surgeries or cuts? No   9. Have you ever had problems with anemia or been told to take iron pills? No   10. Have you had any abnormal blood loss such as black, tarry or bloody stools, or abnormal vaginal bleeding? No   11. Have you ever had a blood transfusion? No   12. Are you willing to have a blood transfusion if it is medically needed before, during, or after your surgery? Yes   13. Have you or any of your relatives ever had problems with anesthesia? No   14. Do you have sleep apnea, excessive snoring or daytime drowsiness? No   15. Do you have any artifical heart valves or other implanted medical devices like a pacemaker, defibrillator, or continuous glucose monitor? No   16. Do you have artificial joints? No   17. Are you allergic to latex? No   18. Is there any chance that you may be pregnant? No       Health Care Directive:  Patient does not have a Health Care Directive or Living Will: Discussed advance care planning with patient; however, patient declined at this time.    Preoperative Review of :   reviewed - no record of controlled substances prescribed.      Status of Chronic Conditions:  See problem list for active medical problems.  Problems all longstanding and stable, except as noted/documented.  See  "ROS for pertinent symptoms related to these conditions.    Review of Systems  Constitutional, neuro, ENT, endocrine, pulmonary, cardiac, gastrointestinal, genitourinary, musculoskeletal, integument and psychiatric systems are negative, except as otherwise noted.    Patient Active Problem List    Diagnosis Date Noted    Menstrual migraine without status migrainosus, not intractable 11/09/2018     Priority: Medium    Mild intermittent asthma with exacerbation 11/05/2018     Priority: Medium    Morbid obesity (H) 02/26/2018     Priority: Medium     Body mass index is 54.44 kg/(m^2).          Past Medical History:   Diagnosis Date    Uncomplicated asthma      Past Surgical History:   Procedure Laterality Date    EYE SURGERY  03/16/2018    bilateral lasik     Current Outpatient Medications   Medication Sig Dispense Refill    albuterol (PROAIR HFA/PROVENTIL HFA/VENTOLIN HFA) 108 (90 Base) MCG/ACT inhaler INHALE 2 PUFFS INTO THE LUNGS EVERY 4 HOURS AS NEEDED FOR SHORTNESS OF BREATH/ DYSPNEA OR WHEEZING 8.5 g 4    buPROPion (WELLBUTRIN XL) 300 MG 24 hr tablet Take 1 tablet (300 mg) by mouth every morning 90 tablet 3    levonorgestrel-ethinyl estradiol (ALTAVERA) 0.15-30 MG-MCG tablet Take 1 tablet by mouth daily - OK to skip placebo pills 84 tablet 3    FLUoxetine (PROZAC) 40 MG capsule Take 1 capsule (40 mg) by mouth daily (Patient not taking: Reported on 12/1/2023) 90 capsule 3       No Known Allergies     Social History     Tobacco Use    Smoking status: Never    Smokeless tobacco: Never   Substance Use Topics    Alcohol use: No     Comment: rarely     Family History   Problem Relation Age of Onset    Diabetes Father     Obesity Father     Hyperlipidemia Mother     Obesity Mother     Asthma Sister     Obesity Sister      History   Drug Use No         Objective     /84   Pulse 85   Temp 97.9  F (36.6  C) (Oral)   Resp 18   Ht 1.702 m (5' 7\")   Wt 127.4 kg (280 lb 12.8 oz)   LMP 11/06/2023 (Within Days)   SpO2 " 99%   BMI 43.98 kg/m      Physical Exam    GENERAL APPEARANCE: healthy, alert and no distress     EYES: EOMI, PERRL     HENT: ear canals and TM's normal and nose and mouth without ulcers or lesions     NECK: no adenopathy, no asymmetry, masses, or scars and thyroid normal to palpation     RESP: lungs clear to auscultation - no rales, rhonchi or wheezes     CV: regular rates and rhythm, normal S1 S2, no S3 or S4 and no murmur, click or rub     ABDOMEN:  soft, nontender, no HSM or masses and bowel sounds normal     MS: extremities normal- no gross deformities noted, no evidence of inflammation in joints, FROM in all extremities.     SKIN: no suspicious lesions or rashes     NEURO: Normal strength and tone, sensory exam grossly normal, mentation intact and speech normal     PSYCH: mentation appears normal. and affect normal/bright     LYMPHATICS: No cervical adenopathy    Recent Labs   Lab Test 02/28/22  0924      POTASSIUM 3.9   CR 0.66        Diagnostics:  Urine Pregnancy:  CBC:   No EKG required, no history of coronary heart disease, significant arrhythmia, peripheral arterial disease or other structural heart disease.    Revised Cardiac Risk Index (RCRI):  The patient has the following serious cardiovascular risks for perioperative complications:   - No serious cardiac risks = 0 points     RCRI Interpretation: 0 points: Class I (very low risk - 0.4% complication rate)         Signed Electronically by: Janet Lorenzo PA-C  Copy of this evaluation report is provided to requesting physician.

## 2023-12-01 NOTE — COMMUNITY RESOURCES LIST (ENGLISH)
12/01/2023   [facility] - Outpatient Clinics  N/A  For additional resource needs, please contact your health insurance member services or your primary care team.  Phone: [phone]   Email: N/A   Address: [address] [place] [address]   Hours: N/A        Financial Stability       Utility payment assistance  1  [place] [facility] - Energy and Utilities Distance: 11.16 miles      In-Person, Phone/Virtual   [address] [place] [address]  Language: English  Hours: Mon - Fri 8:30 AM - 4:30 PM  Fees: Free   Phone: (117) 198-2163 Website: https://[place].gov/[employer]/LBR/energy/consumer-assistance/energy-assistance-program/     2  [place] Public [employer] [facility] - [place]'s Telephone Assistance Plan (TAP) and [facility] Lifeline and Affordable Connectivity Program (ACP) Distance: 15.43 miles      Phone/Virtual   [address] [place] [address]  Language: English  Fees: Free   Phone: (540) 368-3378 Email: danilo.razia@[email or web address].mn. Website: https://[place]/puc/consumers/telephone/          Important Numbers & Websites       [facility]   211 211unitedway.org  Poison Control   (108) 411-3937 [place]  Suicide and Crisis Lifeline   [address] [place], [place] [address]line.org  Childhelp [place] Child Abuse Hotline   334.413.3826 Childhelphotline.org  National Sexual Assault Hotline   (440) 809-2917 (HOPE) Rainn.org  National Runaway Safeline   (197) 774-1818 (RUNAWAY) 1800runaway.org  Pregnancy & Postpartum Support [place]   Call/text 824-634-9855 Ppsupport[place].org  Substance Abuse National Helpline ([facility]   467-511-HELP (5852) Findtreatment.gov  Emergency Services   911

## 2023-12-06 ENCOUNTER — TELEPHONE (OUTPATIENT)
Dept: PEDIATRICS | Facility: CLINIC | Age: 33
End: 2023-12-06
Payer: COMMERCIAL

## 2023-12-06 NOTE — TELEPHONE ENCOUNTER
General Call      Reason for Call:  patient is having surgery 12/7 in thte morning and they are requesting provider to sign off on the pre op that was done on 12/1    What are your questions or concerns:  finish pre op documentation    Date of last appointment with provider: 12/1    Could we send this information to you in [facility] or would you prefer to receive a phone call?:   Patient would prefer a phone call   Okay to leave a detailed message?: No at Other phone number:  no call back needed

## 2024-03-28 ENCOUNTER — PATIENT OUTREACH (OUTPATIENT)
Dept: CARE COORDINATION | Facility: CLINIC | Age: 34
End: 2024-03-28
Payer: COMMERCIAL

## 2024-04-11 ENCOUNTER — PATIENT OUTREACH (OUTPATIENT)
Dept: CARE COORDINATION | Facility: CLINIC | Age: 34
End: 2024-04-11
Payer: COMMERCIAL

## 2024-04-15 ENCOUNTER — MYC REFILL (OUTPATIENT)
Dept: PEDIATRICS | Facility: CLINIC | Age: 34
End: 2024-04-15
Payer: COMMERCIAL

## 2024-04-15 DIAGNOSIS — Z30.41 ENCOUNTER FOR SURVEILLANCE OF CONTRACEPTIVE PILLS: ICD-10-CM

## 2024-04-15 RX ORDER — LEVONORGESTREL AND ETHINYL ESTRADIOL 0.15-0.03
1 KIT ORAL DAILY
Qty: 84 TABLET | Refills: 3 | Status: SHIPPED | OUTPATIENT
Start: 2024-04-15

## 2024-05-24 DIAGNOSIS — E66.812 CLASS 2 OBESITY DUE TO EXCESS CALORIES WITHOUT SERIOUS COMORBIDITY WITH BODY MASS INDEX (BMI) OF 38.0 TO 38.9 IN ADULT: ICD-10-CM

## 2024-05-24 DIAGNOSIS — E66.09 CLASS 2 OBESITY DUE TO EXCESS CALORIES WITHOUT SERIOUS COMORBIDITY WITH BODY MASS INDEX (BMI) OF 38.0 TO 38.9 IN ADULT: ICD-10-CM

## 2024-05-24 RX ORDER — BUPROPION HYDROCHLORIDE 300 MG/1
300 TABLET ORAL EVERY MORNING
Qty: 90 TABLET | Refills: 0 | Status: SHIPPED | OUTPATIENT
Start: 2024-05-24 | End: 2024-08-12

## 2024-05-24 NOTE — TELEPHONE ENCOUNTER
Judith refill provided today - pt due for annual physical prior to further refills.  It's been 2 years since I've seen her.  Please call to inform and schedule.

## 2024-06-04 NOTE — TELEPHONE ENCOUNTER
Refill request for birth control.     Routing to EA refill.     Cristofer Smith RN on 11/1/2021 at 1:54 PM     36.3

## 2024-07-06 ENCOUNTER — HEALTH MAINTENANCE LETTER (OUTPATIENT)
Age: 34
End: 2024-07-06

## 2024-08-09 SDOH — HEALTH STABILITY: PHYSICAL HEALTH: ON AVERAGE, HOW MANY MINUTES DO YOU ENGAGE IN EXERCISE AT THIS LEVEL?: 30 MIN

## 2024-08-09 SDOH — HEALTH STABILITY: PHYSICAL HEALTH: ON AVERAGE, HOW MANY DAYS PER WEEK DO YOU ENGAGE IN MODERATE TO STRENUOUS EXERCISE (LIKE A BRISK WALK)?: 4 DAYS

## 2024-08-09 ASSESSMENT — ASTHMA QUESTIONNAIRES
QUESTION_2 LAST FOUR WEEKS HOW OFTEN HAVE YOU HAD SHORTNESS OF BREATH: ONCE OR TWICE A WEEK
QUESTION_5 LAST FOUR WEEKS HOW WOULD YOU RATE YOUR ASTHMA CONTROL: COMPLETELY CONTROLLED
ACT_TOTALSCORE: 23
QUESTION_4 LAST FOUR WEEKS HOW OFTEN HAVE YOU USED YOUR RESCUE INHALER OR NEBULIZER MEDICATION (SUCH AS ALBUTEROL): ONCE A WEEK OR LESS
QUESTION_1 LAST FOUR WEEKS HOW MUCH OF THE TIME DID YOUR ASTHMA KEEP YOU FROM GETTING AS MUCH DONE AT WORK, SCHOOL OR AT HOME: NONE OF THE TIME
QUESTION_3 LAST FOUR WEEKS HOW OFTEN DID YOUR ASTHMA SYMPTOMS (WHEEZING, COUGHING, SHORTNESS OF BREATH, CHEST TIGHTNESS OR PAIN) WAKE YOU UP AT NIGHT OR EARLIER THAN USUAL IN THE MORNING: NOT AT ALL
ACT_TOTALSCORE: 23

## 2024-08-09 ASSESSMENT — SOCIAL DETERMINANTS OF HEALTH (SDOH): HOW OFTEN DO YOU GET TOGETHER WITH FRIENDS OR RELATIVES?: ONCE A WEEK

## 2024-08-12 ENCOUNTER — OFFICE VISIT (OUTPATIENT)
Dept: PEDIATRICS | Facility: CLINIC | Age: 34
End: 2024-08-12
Payer: COMMERCIAL

## 2024-08-12 VITALS
TEMPERATURE: 98.7 F | OXYGEN SATURATION: 100 % | SYSTOLIC BLOOD PRESSURE: 112 MMHG | WEIGHT: 286 LBS | HEIGHT: 66 IN | BODY MASS INDEX: 45.96 KG/M2 | RESPIRATION RATE: 16 BRPM | DIASTOLIC BLOOD PRESSURE: 80 MMHG | HEART RATE: 86 BPM

## 2024-08-12 DIAGNOSIS — E78.5 HYPERLIPIDEMIA LDL GOAL <100: ICD-10-CM

## 2024-08-12 DIAGNOSIS — J45.909 ASTHMA, UNSPECIFIED ASTHMA SEVERITY, UNSPECIFIED WHETHER COMPLICATED, UNSPECIFIED WHETHER PERSISTENT: ICD-10-CM

## 2024-08-12 DIAGNOSIS — E66.813 CLASS 3 SEVERE OBESITY DUE TO EXCESS CALORIES WITHOUT SERIOUS COMORBIDITY WITH BODY MASS INDEX (BMI) OF 45.0 TO 49.9 IN ADULT (H): ICD-10-CM

## 2024-08-12 DIAGNOSIS — E66.01 CLASS 3 SEVERE OBESITY DUE TO EXCESS CALORIES WITHOUT SERIOUS COMORBIDITY WITH BODY MASS INDEX (BMI) OF 45.0 TO 49.9 IN ADULT (H): ICD-10-CM

## 2024-08-12 DIAGNOSIS — Z00.00 ENCOUNTER FOR PREVENTATIVE ADULT HEALTH CARE EXAMINATION: Primary | ICD-10-CM

## 2024-08-12 PROCEDURE — 99395 PREV VISIT EST AGE 18-39: CPT | Performed by: NURSE PRACTITIONER

## 2024-08-12 PROCEDURE — 99214 OFFICE O/P EST MOD 30 MIN: CPT | Mod: 25 | Performed by: NURSE PRACTITIONER

## 2024-08-12 RX ORDER — BUPROPION HYDROCHLORIDE 300 MG/1
300 TABLET ORAL EVERY MORNING
Qty: 90 TABLET | Refills: 3 | Status: SHIPPED | OUTPATIENT
Start: 2024-08-12

## 2024-08-12 RX ORDER — ALBUTEROL SULFATE 90 UG/1
AEROSOL, METERED RESPIRATORY (INHALATION)
Qty: 8.5 G | Refills: 4 | Status: SHIPPED | OUTPATIENT
Start: 2024-08-12

## 2024-08-12 ASSESSMENT — PAIN SCALES - GENERAL: PAINLEVEL: NO PAIN (0)

## 2024-08-12 NOTE — PATIENT INSTRUCTIONS
Prescription for zepbound sent.     If insurance denies, I recommend contacting them to see what coverage is like for other weight loss medications. Then schedule follow-up visit (virtual is okay) to discuss.     If you end up getting the zepbound and starting that, let me know and I will send through for the next dose. We will then do an e-visit in 1 month for follow-up.

## 2024-08-12 NOTE — PROGRESS NOTES
Preventive Care Visit  [facility] HUI Obrien CNP, Family Medicine  Aug 12, 2024      Assessment & Plan     Encounter for preventative adult health care examination  Age appropriate screening and preventative care have been addressed today. Vaccinations have been reviewed, declines pneumococcal vaccine. Patient has been advised to follow a balanced diet with reduction in cholesterol, and reasonable portion sizes. They have been advised to undertake routine aerobic activity and they were counseled on healthy weight maintenance. Recommend annual vision exams as well as biannual dental exams. They will follow up for annual physical again in one year.   - Pap utd    Class 3 severe obesity due to excess calories without serious comorbidity with body mass index (BMI) of 45.0 to 49.9 in adult (H)  Body mass index is 45.81 kg/m . Spent majority of today's visit discussing her weight. Interested in starting zepbound or mounjaro (rather than ozempic/wegovy) based on reports of acquaintances. She has not checked on insurance coverage. She is taking wellbutrin for weight loss, but doesn't think it is helping her lose weight although doesn't want to stop it, had bad headaches the last time she tried. Will send through for zepbound, if insurance denies, she will look into what her coverage looks like and schedule a follow-up visit to discuss alternatives. If her insurance covers, she will start as prescribed and follow-up with e-visit in 1 month. No contraindications to taking zepbound, reviewed s/e to watch for. All questions answered.   - Lipid panel reflex to direct LDL Fasting; Future  - buPROPion (WELLBUTRIN XL) 300 MG 24 hr tablet; Take 1 tablet (300 mg) by mouth every morning  - tirzepatide-Weight Management (ZEPBOUND) 2.5 MG/0.5ML prefilled pen; Inject 0.5 mLs (2.5 mg) subcutaneously every 7 days  - Comprehensive metabolic panel (BMP + Alb, Alk Phos, ALT, AST, Total. Bili, TP); Future  -  "Hemoglobin A1c; Future  - TSH with free T4 reflex; Future    Hyperlipidemia LDL goal <100  Recommend recheck FLP. She will return when fasting.   - tirzepatide-Weight Management (ZEPBOUND) 2.5 MG/0.5ML prefilled pen; Inject 0.5 mLs (2.5 mg) subcutaneously every 7 days  - Lipid panel reflex to direct LDL Fasting; Future    Asthma, unspecified asthma severity, unspecified whether complicated, unspecified whether persistent  Chronic, stable. Using albuterol prior to exercise. Declines pneumococcal vaccine.   - albuterol (PROAIR HFA/PROVENTIL HFA/VENTOLIN HFA) 108 (90 Base) MCG/ACT inhaler; INHALE 2 PUFFS INTO THE LUNGS EVERY 4 HOURS AS NEEDED FOR SHORTNESS OF BREATH/ DYSPNEA OR WHEEZING        BMI  Estimated body mass index is 45.81 kg/m  as calculated from the following:    Height as of this encounter: 1.683 m (5' 6.25\").    Weight as of this encounter: 129.7 kg (286 lb).   Weight management plan: Discussed healthy diet and exercise guidelines    Counseling  Appropriate preventive services were addressed with this patient via screening, questionnaire, or discussion as appropriate for fall prevention, nutrition, physical activity, Tobacco-use cessation, weight loss and cognition.  Checklist reviewing preventive services available has been given to the patient.  Reviewed patient's diet, addressing concerns and/or questions.         Blake Whalen is a 34 year old, presenting for the following:  Physical        8/12/2024     5:04 PM   Additional Questions   Roomed by Rosa CHOW CMA   Accompanied by Self         8/12/2024     5:04 PM   Patient Reported Additional Medications   Patient reports taking the following new medications n/a        Health Care Directive  Patient does not have a Health Care Directive or Living Will: Discussed advance care planning with patient; however, patient declined at this time.    HPI    #obesity  Jan [id number] lost about 100 pounds through eating less, which was at the time related to " high stress levels. Since then she has gained most of this back. Notes that she is constantly over eating. Walks nearly everyday. Working on mediterranean diet since January 2024, has lost 10 pounds but now plateaued.     Wt Readings from Last 10 Encounters:   08/12/24 129.7 kg (286 lb)   12/01/23 127.4 kg (280 lb 12.8 oz)   04/27/23 112.6 kg (248 lb 4.8 oz)   02/28/22 117 kg (258 lb)   11/29/19 135 kg (297 lb 9.6 oz)   07/25/19 131.5 kg (289 lb 12.8 oz)   11/09/18 143.9 kg (317 lb 3.2 oz)   11/05/18 144 kg (317 lb 8 oz)   08/02/18 (!) 151.8 kg (334 lb 11.2 oz)   04/03/18 (!) 151.6 kg (334 lb 4.8 oz)     #Asthma  Uses albuterol prior to exercise.         8/9/2024   General Health   How would you rate your overall physical health? Good   Feel stress (tense, anxious, or unable to sleep) To some extent      (!) STRESS CONCERN      8/9/2024   Nutrition   Three or more servings of calcium each day? Yes   Diet: Regular (no restrictions)   How many servings of fruit and vegetables per day? (!) 2-3   How many sweetened beverages each day? 0-1            8/9/2024   Exercise   Days per week of moderate/strenous exercise 4 days   Average minutes spent exercising at this level 30 min            8/9/2024   Social Factors   Frequency of gathering with friends or relatives Once a week   Worry food won't last until get money to buy more No   Food not last or not have enough money for food? No   Do you have housing? (Housing is defined as stable permanent housing and does not include staying ouside in a car, in a tent, in an abandoned building, in an overnight shelter, or couch-surfing.) Yes   Are you worried about losing your housing? No   Lack of transportation? No   Unable to get utilities (heat,electricity)? No            8/9/2024   Dental   Dentist two times every year? Yes            8/9/2024   TB Screening   Were you born outside of the [place]? No            Today's PHQ-2 Score:       8/12/2024     4:49 PM   PHQ-2 ( [facility])    Q1: Little interest or pleasure in doing things 0   Q2: Feeling down, depressed or hopeless 1   PHQ-2 Score 1   Q1: Little interest or pleasure in doing things Not at all   Q2: Feeling down, depressed or hopeless Several days   PHQ-2 Score 1           8/9/2024   Substance Use   Alcohol more than 3/day or more than 7/wk No   Do you use any other substances recreationally? No        Social History     Tobacco Use    Smoking status: Never    Smokeless tobacco: Never   Vaping Use    Vaping status: Never Used   Substance Use Topics    Alcohol use: No     Comment: rarely    Drug use: No          Mammogram Screening - Patient under 40 years of age: Routine Mammogram Screening not recommended.         8/9/2024   STI Screening   New sexual partner(s) since last STI/HIV test? No        History of abnormal Pap smear: No - age 30- 64 PAP with HPV every 5 years recommended        Latest Ref Rng & Units 2/28/2022     7:18 AM 8/2/2018    11:14 AM   PAP / HPV   PAP  Negative for Intraepithelial Lesion or Malignancy (NILM)     PAP (Historical)   NIL    HPV 16 DNA Negative Negative     HPV 18 DNA Negative Negative     Other HR HPV Negative Negative             8/9/2024   Contraception/Family Planning   Questions about contraception or family planning No           Reviewed and updated as needed this visit by Provider                    Patient Active Problem List   Diagnosis    Morbid obesity (H)    Menstrual migraine without status migrainosus, not intractable    Mild intermittent asthma with exacerbation     Past Surgical History:   Procedure Laterality Date    ENT SURGERY  12/7/23    Tonsillectomy and Adnoids Removed    EYE SURGERY  03/16/2018    bilateral lasik       Social History     Tobacco Use    Smoking status: Never    Smokeless tobacco: Never   Substance Use Topics    Alcohol use: No     Comment: rarely     Family History   Problem Relation Age of Onset    Diabetes Father     Obesity Father     Hyperlipidemia Mother      "Obesity Mother     Asthma Sister     Obesity Sister               Objective    Exam  /80   Pulse 86   Temp 98.7  F (37.1  C) (Tympanic)   Resp 16   Ht 1.683 m (5' 6.25\")   Wt 129.7 kg (286 lb)   LMP  (LMP Unknown)   SpO2 100%   BMI 45.81 kg/m     Estimated body mass index is 45.81 kg/m  as calculated from the following:    Height as of this encounter: 1.683 m (5' 6.25\").    Weight as of this encounter: 129.7 kg (286 lb).    Physical Exam  Constitutional: appears to be in no acute distress, comfortable, pleasant.   Eyes: anicteric, conjunctiva clear without drainage or erythema. MACHO.   Ears, Nose and Throat: tympanic membranes gray with LR,  nose without nasal discharge. OP: no erythema to posterior pharynx, negative post nasal drainage, tonsils +1 no erythema or exudate.  Neck: supple, thyroid palpable,not enlarged, no nodules   Breast: Exam deferred (deferred after discussion of exam options with patient, no symptoms or concerns).   Cardiovascular: regular rate and rhythm, normal S1 and S2, no murmurs, rubs or gallops, peripheral pulses full and symmetric; negative peripheral edema   Respiratory: Air entry throughout. Breathing pattern unlabored without the use of accessory muscles. Clear to auscultation A and P, no wheezes or crackles, normal breath sounds.    Gastrointestinal: rounded, soft. Positive bowel sounds x4, nontender, no masses.   Genitourinary: Exam deferred (deferred after discussion of exam options with patient, no symptoms or concerns, pap is up to date).   Musculoskeletal: full range of motion, no edema.   Skin: pink, turgor smooth and elastic. Negative for lesions or dryness.  Neurological: normal gait, no tremor.   Psychological: appropriate mood and affect.   Lymphatic: no cervical, axillary, supraclavicular, or infraclavicular lymphadenopathy.          Signed Electronically by: HUI Albarado CNP    "

## 2024-08-14 ENCOUNTER — TELEPHONE (OUTPATIENT)
Dept: PEDIATRICS | Facility: CLINIC | Age: 34
End: 2024-08-14
Payer: COMMERCIAL

## 2024-08-14 NOTE — TELEPHONE ENCOUNTER
Prior Authorization Retail Medication Request    Medication/Dose: tirzepatide-Weight Management (ZEPBOUND) 2.5 MG/0.5ML prefilled pen  Diagnosis and ICD code (if different than what is on RX):    Class 3 severe obesity due to excess calories without serious comorbidity with body mass index (BMI) of 45.0 to 49.9 in adult (H) [E66.01, Z68.42]      Hyperlipidemia LDL goal <100 [E78.5]        New/renewal/insurance change PA/secondary ins. PA:  Previously Tried and Failed:    Rationale:      Insurance   Primary:   Insurance ID:  [id number]    Secondary (if applicable):  Insurance ID:      Pharmacy Information (if different than what is on RX)  Name:  [id number]  Phone:    Fax:

## 2024-08-15 NOTE — TELEPHONE ENCOUNTER
PA Initiation    Medication: ZEPBOUND 2.5 MG/0.5ML SC SOAJ  Insurance Company: [employer] - Phone [phone] Fax [phone]  Pharmacy Filling the Rx: [id number] - [place], [place] - [address] AT [address]  Filling Pharmacy Phone: [phone]  Filling Pharmacy Fax: [phone]  Start Date: 8/15/2024

## 2024-08-19 NOTE — TELEPHONE ENCOUNTER
PRIOR AUTHORIZATION DENIED    Medication: ZEPBOUND 2.5 MG/0.5ML SC SOAJ  Insurance Company: [id number] - Phone [phone] Fax [phone]  Denial Date: 8/15/2024  Denial Reason(s):       Appeal Information:       Patient Notified: NO

## 2024-08-20 ENCOUNTER — MYC MEDICAL ADVICE (OUTPATIENT)
Dept: PEDIATRICS | Facility: CLINIC | Age: 34
End: 2024-08-20
Payer: COMMERCIAL

## 2024-08-20 DIAGNOSIS — E66.01 CLASS 3 SEVERE OBESITY DUE TO EXCESS CALORIES WITHOUT SERIOUS COMORBIDITY WITH BODY MASS INDEX (BMI) OF 45.0 TO 49.9 IN ADULT (H): Primary | ICD-10-CM

## 2024-08-20 DIAGNOSIS — E66.813 CLASS 3 SEVERE OBESITY DUE TO EXCESS CALORIES WITHOUT SERIOUS COMORBIDITY WITH BODY MASS INDEX (BMI) OF 45.0 TO 49.9 IN ADULT (H): Primary | ICD-10-CM

## 2024-08-20 DIAGNOSIS — E78.5 HYPERLIPIDEMIA LDL GOAL <100: ICD-10-CM

## 2024-08-23 ENCOUNTER — LAB (OUTPATIENT)
Dept: LAB | Facility: CLINIC | Age: 34
End: 2024-08-23
Payer: COMMERCIAL

## 2024-08-23 DIAGNOSIS — E66.01 CLASS 3 SEVERE OBESITY DUE TO EXCESS CALORIES WITHOUT SERIOUS COMORBIDITY WITH BODY MASS INDEX (BMI) OF 45.0 TO 49.9 IN ADULT (H): ICD-10-CM

## 2024-08-23 DIAGNOSIS — E66.813 CLASS 3 SEVERE OBESITY DUE TO EXCESS CALORIES WITHOUT SERIOUS COMORBIDITY WITH BODY MASS INDEX (BMI) OF 45.0 TO 49.9 IN ADULT (H): ICD-10-CM

## 2024-08-23 LAB
ALBUMIN SERPL BCG-MCNC: 4 G/DL (ref 3.5–5.2)
ALP SERPL-CCNC: 41 U/L (ref 40–150)
ALT SERPL W P-5'-P-CCNC: 12 U/L (ref 0–50)
ANION GAP SERPL CALCULATED.3IONS-SCNC: 11 MMOL/L (ref 7–15)
AST SERPL W P-5'-P-CCNC: 14 U/L (ref 0–45)
BILIRUB SERPL-MCNC: 0.2 MG/DL
BUN SERPL-MCNC: 14.8 MG/DL (ref 6–20)
CALCIUM SERPL-MCNC: 9.4 MG/DL (ref 8.8–10.4)
CHLORIDE SERPL-SCNC: 106 MMOL/L (ref 98–107)
CHOLEST SERPL-MCNC: 254 MG/DL
CREAT SERPL-MCNC: 0.79 MG/DL (ref 0.51–0.95)
EGFRCR SERPLBLD CKD-EPI 2021: >90 ML/MIN/1.73M2
FASTING STATUS PATIENT QL REPORTED: YES
FASTING STATUS PATIENT QL REPORTED: YES
GLUCOSE SERPL-MCNC: 96 MG/DL (ref 70–99)
HBA1C MFR BLD: 5.5 % (ref 0–5.6)
HCO3 SERPL-SCNC: 22 MMOL/L (ref 22–29)
HDLC SERPL-MCNC: 51 MG/DL
LDLC SERPL CALC-MCNC: 174 MG/DL
NONHDLC SERPL-MCNC: 203 MG/DL
POTASSIUM SERPL-SCNC: 4.8 MMOL/L (ref 3.4–5.3)
PROT SERPL-MCNC: 6.8 G/DL (ref 6.4–8.3)
SODIUM SERPL-SCNC: 139 MMOL/L (ref 135–145)
TRIGL SERPL-MCNC: 145 MG/DL
TSH SERPL DL<=0.005 MIU/L-ACNC: 4.07 UIU/ML (ref 0.3–4.2)

## 2024-08-23 PROCEDURE — 36415 COLL VENOUS BLD VENIPUNCTURE: CPT

## 2024-08-23 PROCEDURE — 80061 LIPID PANEL: CPT

## 2024-08-23 PROCEDURE — 84443 ASSAY THYROID STIM HORMONE: CPT

## 2024-08-23 PROCEDURE — 80053 COMPREHEN METABOLIC PANEL: CPT

## 2024-08-23 PROCEDURE — 83036 HEMOGLOBIN GLYCOSYLATED A1C: CPT

## 2024-08-23 NOTE — TELEPHONE ENCOUNTER
See patient's MyChart message   - Patient in agreement with the Mediation Therapy Management Referral as she would like to discuss alternative options     - Pended Mediation Therapy Management Referral    HUI Albarado CNP, please review and order as appropriate.     Judith RAZA RN   [facility]

## 2024-08-26 PROBLEM — E78.5 HYPERLIPIDEMIA LDL GOAL <100: Status: ACTIVE | Noted: 2024-08-26

## 2024-09-11 NOTE — PROGRESS NOTES
Medication Therapy Management (MTM) Encounter    ASSESSMENT:                            Medication Adherence/Access: No issues identified    Asthma:   Up to date and at goal of ACT > or equal to 20.     Class III Obesity (BMI 40 or more):   Patient would benefit from starting semaglutide and lifestyle modifications.     Oral contraceptives:   stable    Vaccines: Due for influenza, COVID, pneumonia per ACIP/CDC Guidelines: Discussed receiving vaccines at local pharmacy, patient is not agreeable to plan.     PLAN:                            Weight Management:    Start compounded semaglutide 0.25mg once weekly for 4 weeks and then increase to the 0.5mg once weekly  Work on lifestyle modifications:  Work on your diet: 1,200 to 1,500 kcal/day for women.  Activity:  try to aim for at least 30-60 minutes of moderate-intensity physical activity on most (preferably all) days of the week.    Follow-up: Return in 2 months (on 11/18/2024) for MTM Pharmacist Visit, phone visit.    SUBJECTIVE/OBJECTIVE:                          Marlee Appiah is a 34 year old female seen for an initial visit. She was referred to me from Danielle Lane CNP.      Reason for visit: weight management. Phentermine    Allergies/ADRs: Reviewed in chart  Past Medical History: Reviewed in chart  Tobacco: She reports that she has never smoked. She has never used smokeless tobacco.  Alcohol: Less than 1 beverage / month  Caffeine: 1 cup per day    Medication Adherence/Access: Reports no issues  The patient fills medications at [place]: NO, fills medications at [facility].    Asthma   Albuterol inhaler as needed, every 1-2 weeks  Patient reports no current medication side effects.      Triggers include: exercise or sports.  Patient reports the following symptoms: increased shortness of breath upon exertion .         4/27/2023     1:19 PM 12/1/2023     3:06 PM 8/9/2024     1:14 PM   ACT Total Scores   ACT TOTAL SCORE (Goal Greater than or Equal to 20) 22 23  "23   In the past 12 months, how many times did you visit the emergency room for your asthma without being admitted to the hospital? 0 0 0   In the past 12 months, how many times were you hospitalized overnight because of your asthma? 0 0 0      Weight Management   Bupropion 300 mg ER once daily, also to regulate the PMS mood symptoms, but can not turn off wanting to eat consistently  Patient reports no current medication side effects.  Nutrition/Eating Habits: use to track diet and reduce portion size.  Finds that she is hungry and eats more.  She used Synapticonpal in the past also done weight watchers.  She is hoping food noise. Aiming for 8924-1224 calories    Exercise/Activity: walk every day 20-60 minutes with dog every day, winter she does the treadmill.   Medications Tried/Failed:  Phentermine: shortness of breath, chest pain, fast hearbeat  Topiramate-tingling side effects  Zepbound not covered under pharmacy benefit  Initial Consult Weight: 287 lb       Wt Readings from Last 4 Encounters:   08/12/24 286 lb (129.7 kg)   12/01/23 280 lb 12.8 oz (127.4 kg)   04/27/23 248 lb 4.8 oz (112.6 kg)   02/28/22 258 lb (117 kg)     Estimated body mass index is 45.81 kg/m  as calculated from the following:    Height as of 8/12/24: 5' 6.25\" (1.683 m).    Weight as of 8/12/24: 286 lb (129.7 kg).    Oral contraceptive/PMS symptoms:   Levonorgestrel-ethinyl estradiol 0.15-30 mcg every day -Kurvelo  Bupropion  Been on Kurvelo a few years  Helps PMS.  History of menstrual migraines  Side effects: tolerating it, does not have issues of Kurvelo    Today's Vitals: LMP  (LMP Unknown)   ----------------  I spent 32 minutes with this patient today. All changes were made via collaborative practice agreement with Marlena Leonardo MD. A copy of the visit note was provided to the patient's provider(s).    A summary of these recommendations was sent via [employer].    Ana María Edmond, PharmD BCPS  Medication Therapy Management " Practitioner  Pharmacist    Telemedicine Visit Details  Type of service:  Telephone visit  Start Time:  10:02am  End Time: 10:34 AM     Medication Therapy Recommendations  Morbid obesity (H)    Current Medication: buPROPion (WELLBUTRIN XL) 300 MG 24 hr tablet   Rationale: Condition refractory to medication - Ineffective medication - Effectiveness   Recommendation: Start Medication - Wegovy 0.25 MG/0.5ML Soaj   Status: Accepted per CPA         Vaccine counseling    Rationale: Preventive therapy - Needs additional medication therapy - Indication   Recommendation: Start Medication - influenza recob trivalent for ages 50-64 (PF) 0.5 ML injection   Status: Declined per Patient

## 2024-09-12 ENCOUNTER — VIRTUAL VISIT (OUTPATIENT)
Dept: PHARMACY | Facility: CLINIC | Age: 34
End: 2024-09-12
Attending: NURSE PRACTITIONER
Payer: COMMERCIAL

## 2024-09-12 DIAGNOSIS — J45.21 MILD INTERMITTENT ASTHMA WITH EXACERBATION: Primary | ICD-10-CM

## 2024-09-12 DIAGNOSIS — E66.01 MORBID OBESITY (H): ICD-10-CM

## 2024-09-12 DIAGNOSIS — Z71.85 VACCINE COUNSELING: ICD-10-CM

## 2024-09-12 DIAGNOSIS — Z92.0 HISTORY OF ORAL CONTRACEPTIVE USE: ICD-10-CM

## 2024-09-12 PROCEDURE — 99607 MTMS BY PHARM ADDL 15 MIN: CPT | Mod: 93 | Performed by: PHARMACIST

## 2024-09-12 PROCEDURE — 99605 MTMS BY PHARM NP 15 MIN: CPT | Mod: 93 | Performed by: PHARMACIST

## 2024-09-12 NOTE — PATIENT INSTRUCTIONS
Recommendations from today's MTM visit:                                                    MTM (medication therapy management) is a service provided by a clinical pharmacist designed to help you get the most of out of your medicines.     Weight Management:    Start compounded semaglutide 0.25mg once weekly for 4 weeks and then increase to the 0.5mg once weekly, see below for information  Work on lifestyle modifications:  Work on your diet: 1,200 to 1,500 kcal/day for women.  Activity:  try to aim for at least 30-60 minutes of moderate-intensity physical activity on most (preferably all) days of the week.    Compound Semaglutide at [facility]  [facility] is now offering compounded semaglutide during the time of Wegovy national shortage/limited supply. Semaglutide is the generic name of Wegovy. [facility] is following the highest standards for sterility and compounding practices. Not all compounding practices are equal. Therefore, [facility] will not be prescribing compounded semaglutide outside of the [facility]. Compounding of semaglutide is legal for as long as Wegovy is on the FDA's national shortage list. When/if Wegovy is taken off the FDA's shortage list, compounded semaglutide will no longer be legal to manufacture. When this occurs, patients will have to turn to acquiring Wegovy via its available manufactured pen, look into alternative weight loss medication(s), or stop the medication. Compound semaglutide will be available as a pre-filled syringe. Due to high demand of compounded semaglutide, orders may take 1-2 weeks to obtain from time of prescribing. Each dose of the medication will require a separate prescription.     As with any weight loss medication(s), there is a risk of weight regain should you stop semaglutide. It is important to be aware of this risk should you stop compounded semaglutide with no plans to transition  "back to an alternative injectable option as the use of semaglutide is intended for long term weight management with the intention of remaining on this injectable long term.        Obtaining Medication and Storage:   The pharmacy must speak to the patient directly prior to shipping medication to walk through administration, shipping and cost. Vials of compounded semaglutide and unit syringes will be mailed from the compounding pharmacy to your home in a refrigerated box. The vial you will be given is a multi-use vial, meaning that you will use the same vial during the next 28 days for each of your injections. Use a new syringe for each injection. The syringe that will be used to draw up your dose is a \"unit\" syringe, meaning your dose will have an associated \"mg\" and \"units\". Please see your prescription and the below information to verify the dose you should be injecting in UNITS prior to doing your injection. Keep your medication stored in the refrigerator. The vials are to be discarded 28 days after opening (even if there is remaining medication in the vial).     Do not pre-fill syringes from the multi-use vial for future use. Sterility cannot be verified. You should only be drawing up the amount needed for the injection that day, then placing vial back into refrigerator for storage for next injection.     Common Side Effects:  Side effect profile is the same as Wegovy. Monitor for nausea, diarrhea, constipation, headache, indigestion, tiredness (fatigue), stomach upset or abdominal pain. Less commonly, semaglutide can cause low blood sugar (symptoms: shaky, dizzy, sweaty, agitation). Please reach out to the care team should you feel like this is occurring. It is important to ensure that you are eating consistent meals and not skipping meals. Ensure you are getting at least 64 oz water daily. If any side effects become unmanageable, contact the care team immediately.    Dosing:  Each week you will have the " opportunity/option to increase your dose. However, therapy is individualized for each patient. The below is a general guide should someone increase dosage of compound semaglutide each month.   Week 1-4: Inject 0.25 mg (meaning 5 units) subcutaneously once weekly  Week 5-8: If tolerating, increase to 0.5 mg (meaning 10 units) once weekly  Week 9-12: If tolerating, increase to 1 mg (meaning 20 units) once weekly  Week 13-15: If tolerating, increase to 1.5 mg (meaning 30 units) OR 1.7 mg (meaning 34 units) once weekly (dosing depending on what was prescribed by provider)  Week 16-19: If tolerating, increase to 2 mg (meaning 40 units) once weekly  Week 20 & on: If tolerating, increase to 2.4 mg (meaning 48 units) or 2.5 mg (meaning 50 units) once weekly (dosing depending on what was prescribed by provider)  *If you are having some nausea or other side effects to where you are hesitant to move up to the next dose, stay at the same dose you are on for an additional 4 weeks to see if side effect(s) improves/resolves. Make sure to take this time to hydrate and utilizing smaller more consistent meals, such as 4-6 small meals per day.  It may be advantageous to stay at the same dose if you are seeing good efficacy (both on and off the scale) and having minimal/manageable side effects. If you do not have additional refills on that dose's prescription, please reach out to the clinic.    Mg to Unit Conversion Chart for Reference:         Administration:  Follow the instructions to fill the syringe with medicine. Instructions can be accessed at [email or web address].pdf or by scanning this QR code-    Follow the instructions to inject your medication. Instructions can be accessed at [email or web address]  or by scanning this QR code-      Syringe Disposal:   Place the used syringe in a sharps container. You can buy a sharps container at your local pharmacy.   If you don't have a sharps container, you can use a plastic  detergent container with a lid.   Visit Learning About Safe Needle Use and Disposal (HAULwise.net) and safeneedledisposal.org for more information.     Cost:   $230 per month for doses 1 mg or less (one 1 mL vial), $370 per month for doses higher than 1 mg (two 1 mL vials)     [facility] Phone:  [phone]     Work on your diet: 1,200 to 1,500 kcal/day for women.  1,500 to 1,800 kcal/day for men.  Activity:  try to aim for at least 30 minutes of moderate-intensity physical activity on most (preferably all) days of the week.    Improving Your Eating Habits  https://www.cdc.gov/healthy-weight-growth/losing-weight/improve-eating-habits.html    Apps that can be helpful for weight loss  Noom (subscription cost) - https://www.gifted2you/weight-loss/  My Fitness Pal (free version) - https://www.ViewsIQ.FastDue/    Difference between Noom and My Fitness Pal  - https://www.[employer].FastDue/czrq-et-cqqdafprdbyu/    Healthy Eating Tips  Do not drink your calories, except for milk  Track your food intake  Limit the extras to 200 calories per day  Increase water intake  Make non-starchy vegetables the star of your plate  Limit restaurant meals to once per week    American Heart Association Recommendations    American Heart Association Exercise Recommendations    1.      Get at least 150 minutes per week of moderate-intensity aerobic activity or 75 minutes per week of vigorous aerobic activity, or a combination of both, preferably spread throughout the week.    2.      Add moderate- to high-intensity muscle-strengthening activity (such as resistance or weights) on at least 2 days per week.    3.     Spend less time sitting. Even light-intensity activity can offset some of the risks of being sedentary.    4.     Gain even more benefits by being active at least 300 minutes (5 hours) per week.    5.     Increase amount and intensity gradually over  time.  https://www.heart.org/en/healthy-living/fitness/fitness-basics/aha-recs-for-physical-activity-in-adults     To schedule another MTM appointment, please call the clinic directly or you may call the MTM scheduling line at [phone] or toll-free at [phone].     My Clinical Pharmacist's contact information:                                                      It was a pleasure seeing you today!  Please feel free to contact me with any questions or concerns you have.      Ana María Edmond, PharmD BCPS  Medication Therapy Management Practitioner    It was great to speak with you today.  I value your experience and would be very thankful for your time with providing feedback on our clinic survey. You may receive a survey via email or text message in the next few days.

## 2024-12-06 NOTE — PROGRESS NOTES
Medication Therapy Management (MTM) Encounter    ASSESSMENT:                            Medication Adherence/Access: See below for considerations.    Asthma:   Up to date and at goal of ACT > or equal to 20.     Class III Obesity (BMI 40 or more):   Lost 3 lbs since start of semaglutide.  Patient would benefit from increasing dose and increasing exercise.     Oral contraceptives/PMS:   Will reduce the bupropion to see how she does with lower dose and if no issues with PMS then to stop at next visit.  Discussed that recommended to be off semaglutide about 2 months before becoming pregnant.    PLAN:                            Weight Management:   Increase the semaglutide to 20 units or 1mg once weekly.  Activity:  try to aim for at least 30-60 minutes of moderate-intensity physical activity on most (preferably all) days of the week.  Note ??m?glutid? should be discontinued for >=2 months prior to becoming pregnant.     Mood/PMS:  reduce the bupropion to 150mg once daily    Follow-up: Return in about 1 month (around 1/13/2025) for MTM Pharmacist Visit, phone visit.    SUBJECTIVE/OBJECTIVE:                          Marlee Appiah is a 34 year old female seen for a follow-up visit from 9/12/2024.       Reason for visit: not seeing any improvement in weight.    Allergies/ADRs: Reviewed in chart  Past Medical History: Reviewed in chart  Tobacco: She reports that she has never smoked. She has never used smokeless tobacco.  Alcohol: Less than 1 beverage / month  Caffeine: 1 cup per day    Medication Adherence/Access: Reports no issues  The patient fills medications at [place]: NO, fills medications at [facility].    Asthma   Albuterol inhaler as needed, uses more with the cold  Patient reports no current medication side effects.      Triggers include: exercise or sports.  Patient reports the following symptoms: increased shortness of breath upon exertion .         4/27/2023     1:19 PM 12/1/2023     3:06 PM 8/9/2024     1:14  "PM   ACT Total Scores   ACT TOTAL SCORE (Goal Greater than or Equal to 20) 22 23 23   In the past 12 months, how many times did you visit the emergency room for your asthma without being admitted to the hospital? 0  0  0    In the past 12 months, how many times were you hospitalized overnight because of your asthma? 0  0  0        Patient-reported      Weight Management   Semaglutide 0.5mg/10 units once weekly  Bupropion 300 mg ER once daily, also to regulate the PMS mood symptoms     Overall  can not turn off wanting to eat consistently, she is wondering how to wean off the bupropion.  Patient reports current medication side effects: headache after the day she takes it for 1 day  Nutrition/Eating Habits: use to track diet and reduce portion size.  She is meal prepping and has snacks like apple and almonds and this is going well. She is still eating bigger portions. She used [employer] in the past also done weight watchers.  Aiming for 7343-3014 calories    Exercise/Activity: She is walking on treadmill 2-3 times a week--45-60 minutes. In the summer she walks every day 20-60 minutes with dog,  Medications Tried/Failed:  Phentermine: shortness of breath, chest pain, fast hearbeat  Topiramate-tingling side effects  Zepbound not covered under pharmacy benefit  Initial Consult Weight: 287 lb  Home weight today: 284 lb       Wt Readings from Last 4 Encounters:   08/12/24 286 lb (129.7 kg)   12/01/23 280 lb 12.8 oz (127.4 kg)   04/27/23 248 lb 4.8 oz (112.6 kg)   02/28/22 258 lb (117 kg)     Estimated body mass index is 45.81 kg/m  as calculated from the following:    Height as of 8/12/24: 5' 6.25\" (1.683 m).    Weight as of 8/12/24: 286 lb (129.7 kg).    Oral contraceptive/PMS symptoms:   Levonorgestrel-ethinyl estradiol 0.15-30 mcg every day -Kurvelo -she stopped this  Bupropion 300mg ER every day   Reports she would like to stop the bupropion, she recently got engaged and is not expecting to be getting pregnant or " able to get pregnant currently, hopes to in the future.  History of menstrual migraines  Side effects: none, just would like to be on less medication, she gets withdrawal symptoms of headache when she stopped the bupropion in the past    Today's Vitals: There were no vitals taken for this visit.  ----------------      I spent 18 minutes with this patient today. All changes were made via collaborative practice agreement with Marlena Leonardo MD. A copy of the visit note was provided to the patient's provider(s).    A summary of these recommendations was sent via [employer].    Ana María Edmond, PharmD BCPS  Medication Therapy Management Practitioner  Pharmacist      Telemedicine Visit Details  The patient's medications can be safely assessed via a telemedicine encounter.  Type of service:  Telephone visit  Originating Location (pt. Location): Home    Distant Location (provider location):  On-site  Start Time:  8:05AM  End Time: 8:23 AM       Medication Therapy Recommendations  Menstrual migraine without status migrainosus, not intractable   1 Current Medication: buPROPion (WELLBUTRIN XL) 300 MG 24 hr tablet (Discontinued)   Current Medication Sig: Take 1 tablet (300 mg) by mouth every morning   Rationale: Dose too high - Dosage too high - Safety   Recommendation: Decrease Dose   Status: Accepted per CPA   Identified Date: 12/10/2024 Completed Date: 12/10/2024         Morbid obesity (H)   1 Current Medication: COMPOUNDED NON-CONTROLLED SUBSTANCE (CMPD RX) - PHARMACY TO MIX COMPOUNDED MEDICATION   Current Medication Sig: Semaglutide 1mg subcutaneous once weekly, for Wegovy during shortage   Rationale: Dose too low - Dosage too low - Effectiveness   Recommendation: Increase Dose   Status: Accepted per CPA   Identified Date: 12/10/2024 Completed Date: 12/10/2024

## 2024-12-10 ENCOUNTER — VIRTUAL VISIT (OUTPATIENT)
Dept: PHARMACY | Facility: CLINIC | Age: 34
End: 2024-12-10
Payer: COMMERCIAL

## 2024-12-10 DIAGNOSIS — G43.829 MENSTRUAL MIGRAINE WITHOUT STATUS MIGRAINOSUS, NOT INTRACTABLE: ICD-10-CM

## 2024-12-10 DIAGNOSIS — E66.01 MORBID OBESITY (H): Primary | ICD-10-CM

## 2024-12-10 DIAGNOSIS — J45.21 MILD INTERMITTENT ASTHMA WITH EXACERBATION: ICD-10-CM

## 2024-12-10 DIAGNOSIS — Z92.0 HISTORY OF ORAL CONTRACEPTIVE USE: ICD-10-CM

## 2024-12-10 PROCEDURE — 99606 MTMS BY PHARM EST 15 MIN: CPT | Mod: 93 | Performed by: PHARMACIST

## 2024-12-10 PROCEDURE — 99607 MTMS BY PHARM ADDL 15 MIN: CPT | Mod: 93 | Performed by: PHARMACIST

## 2024-12-10 RX ORDER — BUPROPION HYDROCHLORIDE 150 MG/1
150 TABLET ORAL EVERY MORNING
Qty: 30 TABLET | Refills: 2 | Status: SHIPPED | OUTPATIENT
Start: 2024-12-10

## 2024-12-10 NOTE — PATIENT INSTRUCTIONS
Recommendations from today's MTM visit:                                                      Weight Management:   Increase the semaglutide to 20 units or 1mg once weekly.  Activity:  try to aim for at least 30-60 minutes of moderate-intensity physical activity on most (preferably all) days of the week.  Note ??m?glutid? should be discontinued for >=2 months prior to becoming pregnant.     Mood/PMS:  reduce the bupropion to 150mg once daily    Follow-up: Return in about 1 month (around 1/13/2025) for MTM Pharmacist Visit, phone visit.    To schedule another MTM appointment, please call the clinic directly or you may call the MTM scheduling line at [phone] or toll-free at [phone].     My Clinical Pharmacist's contact information:                                                      It was a pleasure seeing you today!  Please feel free to contact me with any questions or concerns you have.      Ana María Edmond, PharmD BCPS  Medication Therapy Management Practitioner    It was great to speak with you today.  I value your experience and would be very thankful for your time with providing feedback on our clinic survey. You may receive a survey via email or text message in the next few days.

## 2025-01-08 NOTE — PROGRESS NOTES
Medication Therapy Management (MTM) Encounter    ASSESSMENT:                            Medication Adherence/Access: See below for considerations.    Asthma:   Up to date and at goal of ACT > or equal to 20.     Class III Obesity (BMI 40 or more):   Lost 0 lbs since start of semaglutide.  Patient would benefit from increasing dose and increasing exercise to include strength training.  Also discussed reducing total calories per day.    Oral contraceptives/PMS:   No current symptoms, previously recommended to be off semaglutide about 2 months before becoming pregnant.    PLAN:                            Weight Management:   Week 1-4 on Tuesday: increase to 1.75mg or 35units  once weekly   Week 5-6: If tolerating, increase to 2 mg (40 units) once weekly  Weeks 7-8:  If tolerating, increase to 2.25 mg (40 units) once weekly  *If you are having nausea or other side effects to where you are hesitant to move up to the next dose, stay at the same dose you are on for an additional 2-4 weeks to see if side effect(s) improve/resolve. Make sure to take this time to hydrate and utilizing smaller more consistent meals, such as 4-6 small meals per day.  It may be advantageous to stay at the same dose if you are seeing good efficacy (both on and off the scale) and having minimal/manageable side effects. If you do not have additional refills on that dose's prescription, please reach out to the clinic.        Work on your diet: usual calories for women are 1,200 to 1,500 kcal/day for women, please try reducing your calories down to 2931-4951 kcal/day.  Try to get at least 60-80 grams of protein in per day.    Activity:  try to aim for at least 150 minutes of moderate-intensity physical activity per week.  If able add moderate- to high-intensity muscle-strengthening activity (such as resistance or weights) on at least 2 days per week.    Follow-up: Return in about 7 weeks (around 3/3/2025) for MTM Pharmacist Visit, phone  visit.    SUBJECTIVE/OBJECTIVE:                          Marlee Appiah is a 34 year old female seen for a follow-up visit from 12/10/2024.       Reason for visit: feel her medication is not working for weight management    Allergies/ADRs: Reviewed in chart  Past Medical History: Reviewed in chart  Tobacco: She reports that she has never smoked. She has never used smokeless tobacco.  Alcohol: Less than 1 beverage / month, but currently not drinking  Caffeine: 1 cup per day    Medication Adherence/Access: Reports no issues  The patient fills medications at [place]: NO, fills medications at [facility].    Asthma   Albuterol inhaler as needed, uses more with the cold--1-2 times a week   Patient reports no current medication side effects.      Triggers include: exercise or sports.  Patient reports the following symptoms: increased shortness of breath upon exertion .         4/27/2023     1:19 PM 12/1/2023     3:06 PM 8/9/2024     1:14 PM   ACT Total Scores   ACT TOTAL SCORE (Goal Greater than or Equal to 20) 22 23 23   In the past 12 months, how many times did you visit the emergency room for your asthma without being admitted to the hospital? 0 0 0   In the past 12 months, how many times were you hospitalized overnight because of your asthma? 0 0 0      Weight Management   Semaglutide 1.25mg/25 units once weekly    Overall  can not turn off wanting to eat consistently, she is wondering how to wean off the bupropion.  Patient reports current medication side effects: headache after the day she takes it for 1 day  Nutrition/Eating Habits:  She used [employer], aiming for 8208-6595 calories    Exercise/Activity: She is exercise 3 days a week--45 minutes-60 minutes, typically treadmill and summer walking dog  Medications Tried/Failed:  Phentermine: shortness of breath, chest pain, fast hearbeat  Topiramate-tingling side effects  Zepbound not covered under pharmacy benefit  Initial Consult Weight: 287 lb  Home weight  "today: 287 lb       Wt Readings from Last 4 Encounters:   08/12/24 286 lb (129.7 kg)   12/01/23 280 lb 12.8 oz (127.4 kg)   04/27/23 248 lb 4.8 oz (112.6 kg)   02/28/22 258 lb (117 kg)     Estimated body mass index is 45.81 kg/m  as calculated from the following:    Height as of 8/12/24: 5' 6.25\" (1.683 m).    Weight as of 8/12/24: 286 lb (129.7 kg).    Oral contraceptive/PMS symptoms:   none  Bupropion 150mg ER every day -stopped this and did not notice any changes and does not feel she needs it  History of menstrual migraines  Side effects: none    Today's Vitals: There were no vitals taken for this visit.  ----------------  I spent 18 minutes with this patient today. All changes were made via collaborative practice agreement with Marlena Leonardo MD.     A summary of these recommendations was sent via [employer].    [facility]ignesh WenD [facility]  Medication Therapy Management Practitioner  Pharmacist      Telemedicine Visit Details  The patient's medications can be safely assessed via a telemedicine encounter.  Type of service:  Telephone visit  Originating Location (pt. Location): Home    Distant Location (provider location):  On-site  Start Time:  8:32am  End Time: 8:50 AM     Medication Therapy Recommendations  Morbid obesity (H)   1 Current Medication: COMPOUNDED NON-CONTROLLED SUBSTANCE (CMPD RX) - PHARMACY TO MIX COMPOUNDED MEDICATION   Current Medication Sig: Semaglutide 1.75mg subcutaneous once weekly for 4 weeks and then increase to 2mg for 2 weeks and then 2.25mg for 2 weeks, for Wegovy during shortage   Rationale: Dose too low - Dosage too low - Effectiveness   Recommendation: Increase Dose   Status: Accepted per CPA   Identified Date: 1/13/2025 Completed Date: 1/13/2025            "

## 2025-01-13 ENCOUNTER — VIRTUAL VISIT (OUTPATIENT)
Dept: PHARMACY | Facility: CLINIC | Age: 35
End: 2025-01-13
Payer: COMMERCIAL

## 2025-01-13 DIAGNOSIS — G43.829 MENSTRUAL MIGRAINE WITHOUT STATUS MIGRAINOSUS, NOT INTRACTABLE: ICD-10-CM

## 2025-01-13 DIAGNOSIS — Z92.0 HISTORY OF ORAL CONTRACEPTIVE USE: ICD-10-CM

## 2025-01-13 DIAGNOSIS — E66.01 MORBID OBESITY (H): Primary | ICD-10-CM

## 2025-01-13 DIAGNOSIS — J45.21 MILD INTERMITTENT ASTHMA WITH EXACERBATION: ICD-10-CM

## 2025-01-13 PROCEDURE — 99605 MTMS BY PHARM NP 15 MIN: CPT | Mod: 93 | Performed by: PHARMACIST

## 2025-01-13 PROCEDURE — 99607 MTMS BY PHARM ADDL 15 MIN: CPT | Mod: 93 | Performed by: PHARMACIST

## 2025-01-13 NOTE — PATIENT INSTRUCTIONS
Recommendations from today's MTM visit:                                                      Weight Management:   Week 1-4 on Tuesday: increase to 1.75mg or 35units  once weekly   Week 5-6: If tolerating, increase to 2 mg (40 units) once weekly  Weeks 7-8:  If tolerating, increase to 2.25 mg (40 units) once weekly  *If you are having nausea or other side effects to where you are hesitant to move up to the next dose, stay at the same dose you are on for an additional 2-4 weeks to see if side effect(s) improve/resolve. Make sure to take this time to hydrate and utilizing smaller more consistent meals, such as 4-6 small meals per day.  It may be advantageous to stay at the same dose if you are seeing good efficacy (both on and off the scale) and having minimal/manageable side effects. If you do not have additional refills on that dose's prescription, please reach out to the clinic.        Work on your diet: usual calories for women are 1,200 to 1,500 kcal/day for women, please try reducing your calories down to 3235-0819 kcal/day.  Try to get at least 60-80 grams of protein in per day.    Activity:  try to aim for at least 150 minutes of moderate-intensity physical activity per week.  If able add moderate- to high-intensity muscle-strengthening activity (such as resistance or weights) on at least 2 days per week.    Follow-up: Return in about 7 weeks (around 3/3/2025) for MTM Pharmacist Visit, phone visit.    To schedule another MTM appointment, please call the clinic directly or you may call the MTM scheduling line at [phone] or toll-free at [phone].     My Clinical Pharmacist's contact information:                                                      It was a pleasure seeing you today!  Please feel free to contact me with any questions or concerns you have.      Ana María Edmond, PharmD BCPS  Medication Therapy Management Practitioner    It was great to speak with you today.  I value your experience and  would be very thankful for your time with providing feedback on our clinic survey. You may receive a survey via email or text message in the next few days.

## 2025-02-26 NOTE — PROGRESS NOTES
Medication Therapy Management (MTM) Encounter    ASSESSMENT:                            Medication Adherence/Access: See below for considerations.    Asthma:   Up to date and at goal of ACT > or equal to 20.     Class III Obesity (BMI 40 or more):   Lost 0 lbs since start of semaglutide.  Patient would benefit from changing to tirzepatide, approximate equivalent doses are 2mg semaglutide = 5mg of tirzepatide, she is on higher strength and discussed converting to the next step of 7.5mg    PLAN:                            Change semaglutide to the tirzepatide  to 7.5mg or 30 units once weekly, if you are not tolerating this you may decrease to the 5mg or 20 units    [facility]  [facility] is now offering compounded tirzepatide during the time of Zepbound/Mounjaro national shortage/limited supply. Tirzepatide is the generic name of Zepbound and Mounjaro. [facility] is following the highest standards for sterility and compounding practices. Not all compounding practices are equal. Therefore, [facility] will not be prescribing compounded tirzepatide outside of the [facility]. Compounding of tirzepatide is legal for as long as Zepbound or Mounjaro is on the FDA's national shortage list. When/if Zepbound and Mounjaro are taken off the FDA's shortage list, compounded tirzepatide will no longer be legal to compound.     On February 11, 2025, the FDA provided further clarification on tirzepatide amid its ongoing national shortage.  While supply of tirzepatide continues to improve, the FDA will be using enforcement discretion until the district court issues a ruling on the plaintiffs  preliminary injunction motion in [employer] ([employer]) v. FDA ([place]  As a result, the compounding of tirzepatide will be permitted to continue until further notice until hearing court's ruling.     If using compound  "tirzepatide for weight loss: Just like any weight loss medication(s), there is a risk of weight regain should you stop tirzepatide. It is important to be aware of this risk should you stop compounded tirzepatide with no plans to transition back to an alternative injectable option as the use of tirzepatide is intended for long term weight management with the intention of remaining on this injectable long term.        Obtaining Medication From Pharmacy:   Automated call will contact patient when pharmacy has received RX. Patient must call the [facility] back to speak directly to team member prior to shipping medication to verify patient name, product, shipping, and cost. During this call, pharmacy technician will verify if needles/syringes will be needed and can be added to order for $5 additional cost per 10 unit syringe packs. Vials of compounded tirzepatide will be mailed from the [facility] pharmacy to your home in a refrigerated box. The vial you will be given is a multi-use vial, meaning that you will use the same vial during the next 28 days for each of your injections. Use a new syringe for each injection. The syringe that will be used to draw up your dose is a \"unit\" syringe, meaning your dose will have an associated \"mg\" and \"units\". Please see your prescription and the below information to verify the dose you should be injecting in UNITS prior to injection.     Storage:  Keep your medication stored in the refrigerator. Discard open vial after 28 days OR the expiration date on yrn bag whichever is sooner (even if there is remaining medication in the vial).     Do not pre-fill syringes from the multi-use vial for future use. Sterility cannot be verified. You should only be drawing up the amount needed for the injection that day, then placing vial back into refrigerator for storage for next injection.     Dosing:  Compound tirzepatide is available as a multi-use vial. It is a subcutaneous " injection that you inject once weekly and titrate the dose slowly over time. Inject same day of the week each week, for example Monday evenings.   Doses available: 2.5 mg, 5 mg, 7.5 mg, 10 mg, 12.5 mg and 15 mg (maximum dose)  Dosing is individualized - the goal is to get to a dose that is the best tolerated and most effective for you. You are not required to increase dose each month or get to maximum dose if getting an effective response with minimal side effects.     Tirzepatide Unit Conversion Chart   25mg/mL, 1.5 mL vial   Dose Units   2.5 mg 10   5 mg 20   7.5 mg 30   10 mg 40   12.5 mg 50   15 mg 60     Common Side Effects:  Side effect profile is the same as Zepbound/Mounjaro because it is the same active ingredient. Monitor for nausea, diarrhea, constipation, headache, indigestion, tiredness (fatigue), stomach upset or abdominal pain. Less commonly, tirzepatide can cause low blood sugar (symptoms: shaky, dizzy, sweaty, agitation). Please reach out to the care team should you feel like this is occurring. It is important to ensure that you are eating consistent meals and not skipping meals. Ensure you are getting at least 64 oz water daily. If any side effects become unmanageable, contact the care team immediately.    Administration:  Follow the instructions to fill the syringe with medicine. Instructions can be accessed at [email or web address] or by scanning this QR code-    Follow the instructions to inject your medication. Instructions can be accessed at [email or web address]  or by scanning this QR code-      Syringe Disposal:   Place the used syringe in a sharps container. You can buy a sharps container at your local pharmacy.   If you don't have a sharps container, you can use a plastic detergent container with a lid.   Visit Learning About Safe Needle Use and Disposal (healthwise.net) and safeneedledisposal.org for more information.     Cost:   ~$300 per 1.5 mL vial + $5 per 10 pack  syringe/needles  Doses less than 10 mg for 1 month supply will require one 1.5 mL vial (~$300/month)  Doses 10 mg and above will require two 1.5 mL (3 mL) vials (~$600/month)     [facility] Phone:  [phone]    States to which [facility] is able to ship to: [place], [place], [place], [place], [place], [place]     Options for continuing GLP1/GIP agonist in 2025 if not available through compounding:  Pay out of pocket for Wegovy or Zepbound pens (>$1000/month cash price without savings card)   Zepbound cost with Zepbound savings card (link below to sign up for this): $650/month with card  https://www.enrollment.zeFon.[employer]est[employer]/enroll/checkEnrollment  Wegovy cost with Wegovy savings card (link below to sign up for this): $650/month with card   https://www.[employer]/coverage-and-savings/save-on-wegovy.html    Zepbound Vials through [facility] pharmacy - vials only available in 2.5 mg, 5 mg, 7.5mg, 10 mg doses  [email or web address]/pharmacy/[email or web address]  $349/month for 2.5mg vials  $499/month for 5mg vials   7.5 mg and 10 mg vials now available with as well for $499/month with regular refills (cost increases if refills not consistently filled - see more info at [facility] website)  $5 per month for administrations supplies (syringe/needles, etc)     Liraglutide daily injections may be a more affordable option with GoodRx coupon (around $330/month at some pharmacies)     Follow-up: Return in 7 weeks (on 4/22/2025) for MTM Pharmacist Visit, phone visit.    SUBJECTIVE/OBJECTIVE:                          Marlee Appiah is a 34 year old female seen for a follow-up visit.       Reason for visit: she would like to try the tirzepatide.    Allergies/ADRs: Reviewed in chart  Past Medical History: Reviewed in chart  Tobacco: She reports that she has never smoked. She has never used smokeless tobacco.  Alcohol: Less than 1 beverage / month, but currently not drinking  Caffeine: 1 cup per  "day    Medication Adherence/Access: Reports no issues  The patient fills medications at [place]: NO, fills medications at [facility].    Asthma   Albuterol inhaler as needed, uses more with the cold--1-2 times a week   Patient reports no current medication side effects.      Triggers include: exercise or sports.  Patient reports the following symptoms: increased shortness of breath upon exertion .         4/27/2023     1:19 PM 12/1/2023     3:06 PM 8/9/2024     1:14 PM   ACT Total Scores   ACT TOTAL SCORE (Goal Greater than or Equal to 20) 22 23 23   In the past 12 months, how many times did you visit the emergency room for your asthma without being admitted to the hospital? 0 0 0   In the past 12 months, how many times were you hospitalized overnight because of your asthma? 0 0 0      Weight Management   Semaglutide 2.25 mg or 45 units once weekly    She is more hungry and crave more instead of suppressing appetite  Patient reports current medication side effects: headache after the day she takes it for 1 day  Nutrition/Eating Habits:  She used myfitnesspal, aiming for 1600 calories, headaches if she eats less    Exercise/Activity: She is exercise daily-45 minutes-60 minutes, typically treadmill and walking dog  Medications Tried/Failed:  Phentermine: shortness of breath, chest pain, fast hearbeat  Topiramate-tingling side effects  Zepbound not covered under pharmacy benefit  Initial Consult Weight: 287 lb  Home weight today: 288 lb       Wt Readings from Last 4 Encounters:   08/12/24 286 lb (129.7 kg)   12/01/23 280 lb 12.8 oz (127.4 kg)   04/27/23 248 lb 4.8 oz (112.6 kg)   02/28/22 258 lb (117 kg)     Estimated body mass index is 45.81 kg/m  as calculated from the following:    Height as of 8/12/24: 5' 6.25\" (1.683 m).    Weight as of 8/12/24: 286 lb (129.7 kg).      Today's Vitals: There were no vitals taken for this visit.  ----------------    I spent 12 minutes with this patient today. All changes were made " via collaborative practice agreement with Marlena Leonardo MD.     A summary of these recommendations was sent via [employer].    Ana María Edmond, PharmD BCPS  Medication Therapy Management Practitioner  Pharmacist    Telemedicine Visit Details  The patient's medications can be safely assessed via a telemedicine encounter.  Type of service:  Telephone visit  Originating Location (pt. Location): Home    Distant Location (provider location):  On-site  Start Time: 8:31 AM  End Time: 8:43 AM     Medication Therapy Recommendations  Morbid obesity (H)   1 Current Medication: COMPOUNDED NON-CONTROLLED SUBSTANCE (CMPD RX) - PHARMACY TO MIX COMPOUNDED MEDICATION (Discontinued)   Current Medication Sig: Semaglutide 1.75mg subcutaneous once weekly for 4 weeks and then increase to 2mg for 2 weeks and then 2.25mg for 2 weeks, for Wegovy during shortage   Rationale: Condition refractory to medication - Ineffective medication - Effectiveness   Recommendation: Change Medication - Zepbound 7.5 MG/0.5ML Soaj   Status: Accepted per CPA   Identified Date: 3/3/2025 Completed Date: 3/3/2025

## 2025-03-03 ENCOUNTER — VIRTUAL VISIT (OUTPATIENT)
Dept: PHARMACY | Facility: CLINIC | Age: 35
End: 2025-03-03
Payer: COMMERCIAL

## 2025-03-03 DIAGNOSIS — J45.21 MILD INTERMITTENT ASTHMA WITH EXACERBATION: ICD-10-CM

## 2025-03-03 DIAGNOSIS — E66.01 MORBID OBESITY (H): Primary | ICD-10-CM

## 2025-03-03 PROCEDURE — 99606 MTMS BY PHARM EST 15 MIN: CPT | Mod: 93 | Performed by: PHARMACIST

## 2025-03-03 NOTE — PATIENT INSTRUCTIONS
Recommendations from today's MTM visit:                                                      Change semaglutide to the tirzepatide  to 7.5mg or 30 units once weekly, if you are not tolerating this you may decrease to the 5mg or 20 units    Compound Tirzepatide at [facility]  [facility] is now offering compounded tirzepatide during the time of Zepbound/Mounjaro national shortage/limited supply. Tirzepatide is the generic name of Zepbound and Mounjaro. [facility] is following the highest standards for sterility and compounding practices. Not all compounding practices are equal. Therefore, [facility] will not be prescribing compounded tirzepatide outside of the [facility]. Compounding of tirzepatide is legal for as long as Zepbound or Mounjaro is on the FDA's national shortage list. When/if Zepbound and Mounjaro are taken off the FDA's shortage list, compounded tirzepatide will no longer be legal to compound.     On February 11, 2025, the FDA provided further clarification on tirzepatide amid its ongoing national shortage.  While supply of tirzepatide continues to improve, the FDA will be using enforcement discretion until the district court issues a ruling on the plaintiffs  preliminary injunction motion in [employer] ([employer]) v. FDA ([place]  As a result, the compounding of tirzepatide will be permitted to continue until further notice until hearing court's ruling.     If using compound tirzepatide for weight loss: Just like any weight loss medication(s), there is a risk of weight regain should you stop tirzepatide. It is important to be aware of this risk should you stop compounded tirzepatide with no plans to transition back to an alternative injectable option as the use of tirzepatide is intended for long term weight management with the intention of remaining on this injectable long term.        Obtaining  "Medication From Pharmacy:   Automated call will contact patient when pharmacy has received RX. Patient must call the [facility] back to speak directly to team member prior to shipping medication to verify patient name, product, shipping, and cost. During this call, pharmacy technician will verify if needles/syringes will be needed and can be added to order for $5 additional cost per 10 unit syringe packs. Vials of compounded tirzepatide will be mailed from the [facility] pharmacy to your home in a refrigerated box. The vial you will be given is a multi-use vial, meaning that you will use the same vial during the next 28 days for each of your injections. Use a new syringe for each injection. The syringe that will be used to draw up your dose is a \"unit\" syringe, meaning your dose will have an associated \"mg\" and \"units\". Please see your prescription and the below information to verify the dose you should be injecting in UNITS prior to injection.     Storage:  Keep your medication stored in the refrigerator. Discard open vial after 28 days OR the expiration date on yrn bag whichever is sooner (even if there is remaining medication in the vial).     Do not pre-fill syringes from the multi-use vial for future use. Sterility cannot be verified. You should only be drawing up the amount needed for the injection that day, then placing vial back into refrigerator for storage for next injection.     Dosing:  Compound tirzepatide is available as a multi-use vial. It is a subcutaneous injection that you inject once weekly and titrate the dose slowly over time. Inject same day of the week each week, for example Monday evenings.   Doses available: 2.5 mg, 5 mg, 7.5 mg, 10 mg, 12.5 mg and 15 mg (maximum dose)  Dosing is individualized - the goal is to get to a dose that is the best tolerated and most effective for you. You are not required to increase dose each month or get to maximum dose if getting an effective " response with minimal side effects.     Tirzepatide Unit Conversion Chart   25mg/mL, 1.5 mL vial   Dose Units   2.5 mg 10   5 mg 20   7.5 mg 30   10 mg 40   12.5 mg 50   15 mg 60     Common Side Effects:  Side effect profile is the same as Zepbound/Mounjaro because it is the same active ingredient. Monitor for nausea, diarrhea, constipation, headache, indigestion, tiredness (fatigue), stomach upset or abdominal pain. Less commonly, tirzepatide can cause low blood sugar (symptoms: shaky, dizzy, sweaty, agitation). Please reach out to the care team should you feel like this is occurring. It is important to ensure that you are eating consistent meals and not skipping meals. Ensure you are getting at least 64 oz water daily. If any side effects become unmanageable, contact the care team immediately.    Administration:  Follow the instructions to fill the syringe with medicine. Instructions can be accessed at [email or web address] or by scanning this QR code-    Follow the instructions to inject your medication. Instructions can be accessed at [email or web address]  or by scanning this QR code-      Syringe Disposal:   Place the used syringe in a sharps container. You can buy a sharps container at your local pharmacy.   If you don't have a sharps container, you can use a plastic detergent container with a lid.   Visit Learning About Safe Needle Use and Disposal (Card Islewise.net) and safeneedledisposal.org for more information.     Cost:   ~$300 per 1.5 mL vial + $5 per 10 pack syringe/needles  Doses less than 10 mg for 1 month supply will require one 1.5 mL vial (~$300/month)  Doses 10 mg and above will require two 1.5 mL (3 mL) vials (~$600/month)     [facility] Phone:  [phone]    States to which [facility] is able to ship to: [place], [place], [place], [place], [place], [place]     Options for continuing GLP1/GIP agonist in 2025 if not available through compounding:  Pay out of pocket for Wegovy  or Zepbound pens (>$1000/month cash price without savings card)   Zepbound cost with Zepbound savings card (link below to sign up for this): $650/month with card  https://www.enrollment.zepbThe [employer]ury Club.[employer]/enroll/checkEnrollment  Wegovy cost with Wegovy savings card (link below to sign up for this): $650/month with card   https://www.card.io/coverage-and-savings/save-on-wegovy.html    Zepbound Vials through [facility] pharmacy - vials only available in 2.5 mg, 5 mg, 7.5mg, 10 mg doses  [email or web address]  $349/month for 2.5mg vials  $499/month for 5mg vials   7.5 mg and 10 mg vials now available with as well for $499/month with regular refills (cost increases if refills not consistently filled - see more info at [facility] website)  $5 per month for administrations supplies (syringe/needles, etc)     Liraglutide daily injections may be a more affordable option with GoodRx coupon (around $330/month at some pharmacies)     Follow-up: Return in 7 weeks (on 4/22/2025) for MTM Pharmacist Visit, phone visit.    To schedule another MTM appointment, please call the clinic directly or you may call the MTM scheduling line at [phone] or toll-free at [phone].     My Clinical Pharmacist's contact information:                                                      It was a pleasure seeing you today!  Please feel free to contact me with any questions or concerns you have.      Ana María Edmond, PharmD BCPS  Medication Therapy Management Practitioner    It was great to speak with you today.  I value your experience and would be very thankful for your time with providing feedback on our clinic survey. You may receive a survey via email or text message in the next few days.

## 2025-03-03 NOTE — Clinical Note
Okay they just came out that [place] is going to compound tirzepatide while the lawsuit is going on, this seems to be every changing.

## 2025-07-14 ENCOUNTER — PATIENT OUTREACH (OUTPATIENT)
Dept: CARE COORDINATION | Facility: CLINIC | Age: 35
End: 2025-07-14
Payer: COMMERCIAL

## 2025-07-28 ENCOUNTER — PATIENT OUTREACH (OUTPATIENT)
Dept: CARE COORDINATION | Facility: CLINIC | Age: 35
End: 2025-07-28
Payer: COMMERCIAL

## 2025-08-14 ENCOUNTER — TELEPHONE (OUTPATIENT)
Dept: PEDIATRICS | Facility: CLINIC | Age: 35
End: 2025-08-14
Payer: COMMERCIAL

## 2025-09-02 ENCOUNTER — DOCUMENTATION ONLY (OUTPATIENT)
Dept: PHARMACY | Facility: CLINIC | Age: 35
End: 2025-09-02
Payer: COMMERCIAL